# Patient Record
Sex: FEMALE | Race: BLACK OR AFRICAN AMERICAN | Employment: PART TIME | URBAN - METROPOLITAN AREA
[De-identification: names, ages, dates, MRNs, and addresses within clinical notes are randomized per-mention and may not be internally consistent; named-entity substitution may affect disease eponyms.]

---

## 2017-01-26 ENCOUNTER — OFFICE VISIT (OUTPATIENT)
Dept: INTERNAL MEDICINE CLINIC | Age: 62
End: 2017-01-26

## 2017-01-26 VITALS
WEIGHT: 139.13 LBS | TEMPERATURE: 97.8 F | HEART RATE: 85 BPM | DIASTOLIC BLOOD PRESSURE: 89 MMHG | HEIGHT: 68 IN | SYSTOLIC BLOOD PRESSURE: 141 MMHG | RESPIRATION RATE: 18 BRPM | BODY MASS INDEX: 21.09 KG/M2

## 2017-01-26 DIAGNOSIS — Z11.59 NEED FOR HEPATITIS C SCREENING TEST: ICD-10-CM

## 2017-01-26 DIAGNOSIS — Z00.00 PREVENTATIVE HEALTH CARE: Primary | ICD-10-CM

## 2017-01-26 DIAGNOSIS — E78.00 PURE HYPERCHOLESTEROLEMIA: ICD-10-CM

## 2017-01-26 DIAGNOSIS — I10 ESSENTIAL HYPERTENSION: ICD-10-CM

## 2017-01-26 RX ORDER — ROSUVASTATIN CALCIUM 5 MG/1
2.5 TABLET, COATED ORAL
Qty: 45 TAB | Refills: 2 | Status: SHIPPED | OUTPATIENT
Start: 2017-01-26 | End: 2017-05-13 | Stop reason: SDUPTHER

## 2017-01-26 RX ORDER — LISINOPRIL 10 MG/1
TABLET ORAL
Qty: 90 TAB | Refills: 2 | Status: SHIPPED | OUTPATIENT
Start: 2017-01-26 | End: 2017-09-24 | Stop reason: SDUPTHER

## 2017-01-26 NOTE — PATIENT INSTRUCTIONS
Well Visit, Women 48 to 72: Care Instructions  Your Care Instructions  Physical exams can help you stay healthy. Your doctor has checked your overall health and may have suggested ways to take good care of yourself. He or she also may have recommended tests. At home, you can help prevent illness with healthy eating, regular exercise, and other steps. Follow-up care is a key part of your treatment and safety. Be sure to make and go to all appointments, and call your doctor if you are having problems. It's also a good idea to know your test results and keep a list of the medicines you take. How can you care for yourself at home? · Reach and stay at a healthy weight. This will lower your risk for many problems, such as obesity, diabetes, heart disease, and high blood pressure. · Get at least 30 minutes of exercise on most days of the week. Walking is a good choice. You also may want to do other activities, such as running, swimming, cycling, or playing tennis or team sports. · Do not smoke. Smoking can make health problems worse. If you need help quitting, talk to your doctor about stop-smoking programs and medicines. These can increase your chances of quitting for good. · Protect your skin from too much sun. When you're outdoors from 10 a.m. to 4 p.m., stay in the shade or cover up with clothing and a hat with a wide brim. Wear sunglasses that block UV rays. Even when it's cloudy, put broad-spectrum sunscreen (SPF 30 or higher) on any exposed skin. · See a dentist one or two times a year for checkups and to have your teeth cleaned. · Wear a seat belt in the car. · Limit alcohol to 1 drink a day. Too much alcohol can cause health problems. Follow your doctor's advice about when to have certain tests. These tests can spot problems early. · Cholesterol.  Your doctor will tell you how often to have this done based on your age, family history, or other things that can increase your risk for heart attack and stroke. · Blood pressure. Have your blood pressure checked during a routine doctor visit. Your doctor will tell you how often to check your blood pressure based on your age, your blood pressure results, and other factors. · Mammogram. Ask your doctor how often you should have a mammogram, which is an X-ray of your breasts. A mammogram can spot breast cancer before it can be felt and when it is easiest to treat. · Pap test and pelvic exam. Ask your doctor how often you should have a Pap test. You may not need to have a Pap test as often as you used to. · Vision. Have your eyes checked every year or two or as often as your doctor suggests. Some experts recommend that you have yearly exams for glaucoma and other age-related eye problems starting at age 48. · Hearing. Tell your doctor if you notice any change in your hearing. You can have tests to find out how well you hear. · Diabetes. Ask your doctor whether you should have tests for diabetes. · Colon cancer. You should begin tests for colon cancer at age 48. You may have one of several tests. Your doctor will tell you how often to have tests based on your age and risk. Risks include whether you already had a precancerous polyp removed from your colon or whether your parents, sisters and brothers, or children have had colon cancer. · Thyroid disease. Talk to your doctor about whether to have your thyroid checked as part of a regular physical exam. Women have an increased chance of a thyroid problem. · Osteoporosis. You should begin tests for bone density at age 72. If you are younger than 72, ask your doctor whether you have factors that may increase your risk for this disease. You may want to have this test before age 72. · Heart attack and stroke risk. At least every 4 to 6 years, you should have your risk for heart attack and stroke assessed.  Your doctor uses factors such as your age, blood pressure, cholesterol, and whether you smoke or have diabetes to show what your risk for a heart attack or stroke is over the next 10 years. When should you call for help? Watch closely for changes in your health, and be sure to contact your doctor if you have any problems or symptoms that concern you. Where can you learn more? Go to http://trenton-kristian.info/. Enter V723 in the search box to learn more about \"Well Visit, Women 50 to 72: Care Instructions. \"  Current as of: July 19, 2016  Content Version: 11.1  © 2065-3484 [employer]. Care instructions adapted under license by [employer] (which disclaims liability or warranty for this information). If you have questions about a medical condition or this instruction, always ask your healthcare professional. [address] any warranty or liability for your use of this information.

## 2017-01-26 NOTE — PROGRESS NOTES
HISTORY OF PRESENT ILLNESS  Angela Griffith is a 59 y.o. female. HPI  Angela Griffith is here for complete health maintenance physical exam and screening. she does have other concerns. Hypertension:  Angela Griffith is a 59 y.o. female with hypertension. without Chronic kidney disease stage    Medication change since last visit: No  The patient reports:  taking medications as instructed, no medication side effects noted, home BP monitoring in range of 352'K systolic over 08'S diastolic, no chest pain on exertion, no dyspnea on exertion, no swelling of ankles, no orthostatic dizziness or lightheadedness, no palpitations. Lifestyle modification/social history: generally follows a low fat low cholesterol diet, generally follows a low sodium diet, exercises regularly, nonsmoker    Lab Results   Component Value Date/Time    Sodium 142 07/21/2016 10:36 AM    Potassium 4.7 07/21/2016 10:36 AM    Chloride 103 07/21/2016 10:36 AM    CO2 25 07/21/2016 10:36 AM    Glucose 95 07/21/2016 10:36 AM    BUN 12 07/21/2016 10:36 AM    Creatinine 0.87 07/21/2016 10:36 AM    BUN/Creatinine ratio 14 07/21/2016 10:36 AM    GFR est AA 83 07/21/2016 10:36 AM    GFR est non-AA 72 07/21/2016 10:36 AM    Calcium 9.4 07/21/2016 10:36 AM         Hyperlipidemia:  Angela Griffith is following up on her dyslipidemia. Cardiovascular risks for her are: LDL goal is under 100  hypertension.    Currently she takes  , crestor  Lab Results   Component Value Date/Time    Cholesterol, total 189 07/21/2016 10:36 AM    Cholesterol, total 178 12/22/2015 09:52 AM    Cholesterol, total 261 08/27/2015 09:38 AM    Cholesterol, total 266 05/12/2014 03:55 PM    Cholesterol, total 228 04/29/2013 12:00 AM    HDL Cholesterol 95 07/21/2016 10:36 AM    HDL Cholesterol 85 12/22/2015 09:52 AM    HDL Cholesterol 94 08/27/2015 09:38 AM    HDL Cholesterol 103 05/12/2014 03:55 PM    HDL Cholesterol 88 04/29/2013 12:00 AM    LDL, calculated 87 07/21/2016 10:36 AM    LDL, calculated 82 12/22/2015 09:52 AM    LDL, calculated 148 08/27/2015 09:38 AM    LDL, calculated 151 05/12/2014 03:55 PM    LDL, calculated 130 04/29/2013 12:00 AM    Triglyceride 37 07/21/2016 10:36 AM    Triglyceride 56 12/22/2015 09:52 AM    Triglyceride 94 08/27/2015 09:38 AM    Triglyceride 59 05/12/2014 03:55 PM    Triglyceride 51 04/29/2013 12:00 AM     Lab Results   Component Value Date/Time    ALT 11 12/22/2015 09:52 AM    AST 17 12/22/2015 09:52 AM    Alk. phosphatase 67 12/22/2015 09:52 AM    Bilirubin, direct 0.22 12/22/2015 09:52 AM    Bilirubin, total 0.9 12/22/2015 09:52 AM       Myalgias: no  Fatigue: no          Health maintenance hx includes:  Exercise: moderately active. Form of exercise: walking     Diet: generally follows a low fat low cholesterol diet    Cancer screening:    Colon cancer screening:  Last Colonoscopy: 2015 and   was normal   Breast cancer screening: last mammogram 2016 and   was normal   Cervical cancer screening: last PAP/Pelvic exam: 2016   and was normal.   Osteoporosis screening:  Last BMD:  never    Lab Results   Component Value Date/Time    Cholesterol, total 189 07/21/2016 10:36 AM    HDL Cholesterol 95 07/21/2016 10:36 AM    LDL, calculated 87 07/21/2016 10:36 AM    VLDL, calculated 7 07/21/2016 10:36 AM    Triglyceride 37 07/21/2016 10:36 AM         Lab Results   Component Value Date/Time    Glucose 95 07/21/2016 10:36 AM       Immunizations:     Immunization History   Administered Date(s) Administered    DTAP Vaccine 10/17/2011    Influenza Vaccine 10/29/2012, 10/01/2013, 10/01/2014, 10/01/2015, 10/01/2016    Zoster Vaccine, Live 08/05/2013      Immunization status: up to date and documented.        Social History     Social History    Marital status: SINGLE     Spouse name: N/A    Number of children: N/A    Years of education: N/A     Occupational History     Crisis intake      Social History Main Topics    Smoking status: Never Smoker    Smokeless tobacco: Never Used    Alcohol use No    Drug use: No    Sexual activity: Not Currently     Other Topics Concern    Exercise Yes     walking 3 times daily. Social History Narrative     History reviewed. No pertinent past surgical history. Family History   Problem Relation Age of Onset    Seizures Mother     Heart Failure Father     Other Father      PUD    Heart Disease Father     Hypertension Sister     Breast Cancer Sister 59     no BRCA testing    Cancer Sister      breast    Asthma Brother     Hypertension Sister     Asthma Other     Elevated Lipids Neg Hx     Diabetes Neg Hx      Current Outpatient Prescriptions on File Prior to Visit   Medication Sig Dispense Refill    lisinopril (PRINIVIL, ZESTRIL) 10 mg tablet TAKE 1 TABLET BY MOUTH EVERY DAY 30 Tab 5    rosuvastatin (CRESTOR) 5 mg tablet Take 2.5 mg by mouth nightly.  aspirin delayed-release 81 mg tablet Take  by mouth daily.  melatonin (MELATONIN) 5 mg cap capsule Take 5 mg by mouth as needed. No current facility-administered medications on file prior to visit. .    Review of Systems   Constitutional: Negative for malaise/fatigue and weight loss. HENT: Negative for congestion and sore throat. Eyes: Negative for blurred vision and pain. Respiratory: Negative for cough, shortness of breath and wheezing. Cardiovascular: Negative for chest pain, palpitations and leg swelling. Gastrointestinal: Positive for constipation. Negative for abdominal pain, blood in stool, diarrhea, heartburn, nausea and vomiting. Genitourinary: Negative for dysuria and hematuria. Musculoskeletal: Negative for back pain and joint pain. Skin: Negative for itching and rash. Neurological: Negative for dizziness and headaches. Endo/Heme/Allergies: Negative for environmental allergies. Does not bruise/bleed easily. Psychiatric/Behavioral: Negative for depression and substance abuse.  The patient is not nervous/anxious and does not have insomnia. Physical Exam   Constitutional: She is oriented to person, place, and time. She appears well-developed and well-nourished. No distress. /89 (BP 1 Location: Left arm, BP Patient Position: Sitting)  Pulse 85  Temp 97.8 °F (36.6 °C) (Oral)   Resp 18  Ht 5' 8\" (1.727 m)  Wt 139 lb 2 oz (63.1 kg)  BMI 21.15 kg/m2Body mass index is 21.15 kg/(m^2). HENT:   Head: Normocephalic. Right Ear: Hearing, tympanic membrane and ear canal normal. No decreased hearing is noted. Left Ear: Hearing, tympanic membrane and ear canal normal. No decreased hearing is noted. Nose: Nose normal.   Mouth/Throat: Oropharynx is clear and moist and mucous membranes are normal. Normal dentition. No oropharyngeal exudate. Eyes: Conjunctivae and lids are normal. Pupils are equal, round, and reactive to light. No scleral icterus. Neck: Trachea normal and normal range of motion. Neck supple. No thyromegaly present. Cardiovascular: Normal rate, regular rhythm, normal heart sounds and intact distal pulses. No murmur heard. Pulmonary/Chest: Effort normal and breath sounds normal.   Abdominal: Soft. Normal appearance and bowel sounds are normal. She exhibits no distension and no mass. There is no hepatosplenomegaly. There is no tenderness. Musculoskeletal: Normal range of motion. She exhibits no edema or tenderness. Lymphadenopathy:     She has no cervical adenopathy. Neurological: She is alert and oriented to person, place, and time. Skin: Skin is warm and dry. No rash noted. She is not diaphoretic. Psychiatric: She has a normal mood and affect. Her speech is normal and behavior is normal. Judgment and thought content normal. Cognition and memory are normal.   Nursing note and vitals reviewed. ASSESSMENT and PLAN  Félix Caballero was seen today for well woman.     Diagnoses and all orders for this visit:    Preventative health care  she was advised to have follow up colonoscopy in 2025  Pap/ mammogram per her GYN specialist yearly. Sarah Escamilla was counseled on age-appropriate/ guideline-based risk prevention behaviors and screening for a 59y.o. year old   female . We also discussed adjustments in screening based on family history if necessary. Printed instructions for preventative screening guidelines and healthy behaviors given to patient with after visit summary. Essential hypertension - Well controlled and stable. her medications were reviewed and refilled where necessary as noted below. Labs ordered as noted. -     METABOLIC PANEL, BASIC  -     lisinopril (PRINIVIL, ZESTRIL) 10 mg tablet; TAKE 1 TABLET BY MOUTH EVERY DAY    Pure hypercholesterolemia - Well controlled and stable. her medications were reviewed and refilled where necessary as noted below. Labs ordered as noted. -     LIPID PANEL  -     rosuvastatin (CRESTOR) 5 mg tablet; Take 0.5 Tabs by mouth nightly. Need for hepatitis C screening test  -     HEPATITIS C AB      Follow-up Disposition:  Return in about 6 months (around 7/26/2017).

## 2017-01-26 NOTE — MR AVS SNAPSHOT
Visit Information Date & Time Provider Department Dept. Phone Encounter #  
 1/26/2017 10:15 AM Ramila Rosenberg MD [facility] [address] [facility] [id number] Follow-up Instructions Return in about 6 months (around 7/26/2017). Your Appointments 5/23/2017 10:30 AM  
ESTABLISHED PATIENT with Sula Sandifer, MD  
Lázaro Bain ([address]) Appt Note: ae  
 [address] [facility] [address] [address]  
[facility] [address] [place] [address] Upcoming Health Maintenance Date Due Hepatitis C Screening 1955 INFLUENZA AGE 9 TO ADULT 8/1/2016 BREAST CANCER SCRN MAMMOGRAM 2/26/2017 PAP AKA CERVICAL CYTOLOGY 5/13/2020 DTaP/Tdap/Td series (2 - Td) 10/17/2021 COLONOSCOPY 9/1/2025 Allergies as of 1/26/2017  Review Complete On: 1/26/2017 By: Ramila Rosenberg MD  
 No Known Allergies Current Immunizations  Reviewed on 1/26/2017 Name Date DTAP Vaccine 10/17/2011 Influenza Vaccine 10/1/2016, 10/1/2015, 10/1/2014, 10/1/2013, 10/29/2012 Zoster Vaccine, Live 8/5/2013 Reviewed by Ramila Rosenberg MD on 1/26/2017 at 10:37 AM  
You Were Diagnosed With   
  
 Codes Comments Preventative health care    -  Primary ICD-10-CM: Z00.00 ICD-9-CM: V70.0 Essential hypertension     ICD-10-CM: I10 
ICD-9-CM: 401.9 Pure hypercholesterolemia     ICD-10-CM: E78.00 ICD-9-CM: 272.0 Need for hepatitis C screening test     ICD-10-CM: Z11.59 
ICD-9-CM: V73.89 Vitals BP Pulse Temp Resp Height(growth percentile) Weight(growth percentile) 141/89 (BP 1 Location: Left arm, BP Patient Position: Sitting) 85 97.8 °F (36.6 °C) (Oral) 18 5' 8\" (1.727 m) 139 lb 2 oz (63.1 kg) BMI OB Status Smoking Status 21.15 kg/m2 Postmenopausal Never Smoker Vitals History BMI and BSA Data Body Mass Index Body Surface Area 21.15 kg/m 2 1.74 m 2 Preferred Pharmacy Pharmacy Name Phone [facility] [address] [phone] Your Updated Medication List  
  
   
This list is accurate as of: 1/26/17 10:49 AM.  Always use your most recent med list.  
  
  
  
  
 aspirin delayed-release 81 mg tablet Take  by mouth daily. lisinopril 10 mg tablet Commonly known as:  PRINIVIL, ZESTRIL  
TAKE 1 TABLET BY MOUTH EVERY DAY  
  
 melatonin 5 mg Cap capsule Take 5 mg by mouth as needed. rosuvastatin 5 mg tablet Commonly known as:  CRESTOR Take 0.5 Tabs by mouth nightly. Prescriptions Sent to Pharmacy Refills  
 rosuvastatin (CRESTOR) 5 mg tablet 2 Sig: Take 0.5 Tabs by mouth nightly. Class: Normal  
 Pharmacy: [facility] [address] [place] [address] Ph #: [phone] Route: Oral  
 lisinopril (PRINIVIL, ZESTRIL) 10 mg tablet 2 Sig: TAKE 1 TABLET BY MOUTH EVERY DAY Class: Normal  
 Pharmacy: [facility] [address] [place] [address] Ph #: [phone] We Performed the Following HEPATITIS C AB [50777 CPT(R)] LIPID PANEL [83750 CPT(R)] METABOLIC PANEL, BASIC [35359 CPT(R)] Follow-up Instructions Return in about 6 months (around 7/26/2017). Patient Instructions Well Visit, Women 48 to 72: Care Instructions Your Care Instructions Physical exams can help you stay healthy. Your doctor has checked your overall health and may have suggested ways to take good care of yourself. He or she also may have recommended tests. At home, you can help prevent illness with healthy eating, regular exercise, and other steps. Follow-up care is a key part of your treatment and safety.  Be sure to make and go to all appointments, and call your doctor if you are having problems. It's also a good idea to know your test results and keep a list of the medicines you take. How can you care for yourself at home? · Reach and stay at a healthy weight. This will lower your risk for many problems, such as obesity, diabetes, heart disease, and high blood pressure. · Get at least 30 minutes of exercise on most days of the week. Walking is a good choice. You also may want to do other activities, such as running, swimming, cycling, or playing tennis or team sports. · Do not smoke. Smoking can make health problems worse. If you need help quitting, talk to your doctor about stop-smoking programs and medicines. These can increase your chances of quitting for good. · Protect your skin from too much sun. When you're outdoors from 10 a.m. to 4 p.m., stay in the shade or cover up with clothing and a hat with a wide brim. Wear sunglasses that block UV rays. Even when it's cloudy, put broad-spectrum sunscreen (SPF 30 or higher) on any exposed skin. · See a dentist one or two times a year for checkups and to have your teeth cleaned. · Wear a seat belt in the car. · Limit alcohol to 1 drink a day. Too much alcohol can cause health problems. Follow your doctor's advice about when to have certain tests. These tests can spot problems early. · Cholesterol. Your doctor will tell you how often to have this done based on your age, family history, or other things that can increase your risk for heart attack and stroke. · Blood pressure. Have your blood pressure checked during a routine doctor visit. Your doctor will tell you how often to check your blood pressure based on your age, your blood pressure results, and other factors. · Mammogram. Ask your doctor how often you should have a mammogram, which is an X-ray of your breasts. A mammogram can spot breast cancer before it can be felt and when it is easiest to treat.  
· Pap test and pelvic exam. Ask your doctor how often you should have a Pap test. You may not need to have a Pap test as often as you used to. · Vision. Have your eyes checked every year or two or as often as your doctor suggests. Some experts recommend that you have yearly exams for glaucoma and other age-related eye problems starting at age 48. · Hearing. Tell your doctor if you notice any change in your hearing. You can have tests to find out how well you hear. · Diabetes. Ask your doctor whether you should have tests for diabetes. · Colon cancer. You should begin tests for colon cancer at age 48. You may have one of several tests. Your doctor will tell you how often to have tests based on your age and risk. Risks include whether you already had a precancerous polyp removed from your colon or whether your parents, sisters and brothers, or children have had colon cancer. · Thyroid disease. Talk to your doctor about whether to have your thyroid checked as part of a regular physical exam. Women have an increased chance of a thyroid problem. · Osteoporosis. You should begin tests for bone density at age 72. If you are younger than 72, ask your doctor whether you have factors that may increase your risk for this disease. You may want to have this test before age 72. · Heart attack and stroke risk. At least every 4 to 6 years, you should have your risk for heart attack and stroke assessed. Your doctor uses factors such as your age, blood pressure, cholesterol, and whether you smoke or have diabetes to show what your risk for a heart attack or stroke is over the next 10 years. When should you call for help? Watch closely for changes in your health, and be sure to contact your doctor if you have any problems or symptoms that concern you. Where can you learn more? Go to http://trenton-kristian.info/. Enter M971 in the search box to learn more about \"Well Visit, Women 50 to 72: Care Instructions. \" Current as of: July 19, 2016 Content Version: 11.1 © 2335-2208 [employer]orporated. Care instructions adapted under license by [employer] (which disclaims liability or warranty for this information). If you have questions about a medical condition or this instruction, always ask your healthcare professional. [address] any warranty or liability for your use of this information. Introducing [facility]! Calli Rockwell introduces Zen[employer]ime patient portal. Now you can access parts of your medical record, email your doctor's office, and request medication refills online. 1. In your internet browser, go to https://[employer]. [employer] 2. Click on the First Time User? Click Here link in the Sign In box. You will see the New Member Sign Up page. 3. Enter your Zen[employer]ime Access Code exactly as it appears below. You will not need to use this code after youve completed the sign-up process. If you do not sign up before the expiration date, you must request a new code. · Zen[employer] Access Code: [email or web address] Expires: 4/26/2017 10:36 AM 
 
4. Enter the last four digits of your Social Security Number (xxxx) and Date of Birth (mm/dd/yyyy) as indicated and click Submit. You will be taken to the next sign-up page. 5. Create a Zenytime ID. This will be your Zenytime login ID and cannot be changed, so think of one that is secure and easy to remember. 6. Create a [employer] password. You can change your password at any time. 7. Enter your Password Reset Question and Answer. This can be used at a later time if you forget your password. 8. Enter your e-mail address. You will receive e-mail notification when new information is available in [address] 9. Click Sign Up. You can now view and download portions of your medical record. 10. Click the Download Summary menu link to download a portable copy of your medical information.  
 
If you have questions, please visit the Frequently Asked Questions section of the "[employer]. Remember, [employer] is NOT to be used for urgent needs. For medical emergencies, dial 911. Now available from your iPhone and Android! Please provide this summary of care documentation to your next provider. Your primary care clinician is listed as Rubina Wright. If you have any questions after today's visit, please call [phone].

## 2017-01-27 LAB
BUN SERPL-MCNC: 16 MG/DL (ref 8–27)
BUN/CREAT SERPL: 18 (ref 11–26)
CALCIUM SERPL-MCNC: 9.9 MG/DL (ref 8.7–10.3)
CHLORIDE SERPL-SCNC: 99 MMOL/L (ref 96–106)
CHOLEST SERPL-MCNC: 222 MG/DL (ref 100–199)
CO2 SERPL-SCNC: 27 MMOL/L (ref 18–29)
CREAT SERPL-MCNC: 0.88 MG/DL (ref 0.57–1)
GLUCOSE SERPL-MCNC: 86 MG/DL (ref 65–99)
HCV AB S/CO SERPL IA: <0.1 S/CO RATIO (ref 0–0.9)
HDLC SERPL-MCNC: 112 MG/DL
INTERPRETATION, 910389: NORMAL
LDLC SERPL CALC-MCNC: 100 MG/DL (ref 0–99)
POTASSIUM SERPL-SCNC: 4.2 MMOL/L (ref 3.5–5.2)
SODIUM SERPL-SCNC: 141 MMOL/L (ref 134–144)
TRIGL SERPL-MCNC: 50 MG/DL (ref 0–149)
VLDLC SERPL CALC-MCNC: 10 MG/DL (ref 5–40)

## 2017-05-23 ENCOUNTER — OFFICE VISIT (OUTPATIENT)
Dept: OBGYN CLINIC | Age: 62
End: 2017-05-23

## 2017-05-23 VITALS
BODY MASS INDEX: 21.82 KG/M2 | DIASTOLIC BLOOD PRESSURE: 80 MMHG | SYSTOLIC BLOOD PRESSURE: 126 MMHG | WEIGHT: 144 LBS | HEIGHT: 68 IN

## 2017-05-23 DIAGNOSIS — Z01.419 ENCOUNTER FOR GYNECOLOGICAL EXAMINATION (GENERAL) (ROUTINE) WITHOUT ABNORMAL FINDINGS: Primary | ICD-10-CM

## 2017-05-23 NOTE — PATIENT INSTRUCTIONS
Well Visit, Women 48 to 72: Care Instructions  Your Care Instructions  Physical exams can help you stay healthy. Your doctor has checked your overall health and may have suggested ways to take good care of yourself. He or she also may have recommended tests. At home, you can help prevent illness with healthy eating, regular exercise, and other steps. Follow-up care is a key part of your treatment and safety. Be sure to make and go to all appointments, and call your doctor if you are having problems. It's also a good idea to know your test results and keep a list of the medicines you take. How can you care for yourself at home? · Reach and stay at a healthy weight. This will lower your risk for many problems, such as obesity, diabetes, heart disease, and high blood pressure. · Get at least 30 minutes of exercise on most days of the week. Walking is a good choice. You also may want to do other activities, such as running, swimming, cycling, or playing tennis or team sports. · Do not smoke. Smoking can make health problems worse. If you need help quitting, talk to your doctor about stop-smoking programs and medicines. These can increase your chances of quitting for good. · Protect your skin from too much sun. When you're outdoors from 10 a.m. to 4 p.m., stay in the shade or cover up with clothing and a hat with a wide brim. Wear sunglasses that block UV rays. Even when it's cloudy, put broad-spectrum sunscreen (SPF 30 or higher) on any exposed skin. · See a dentist one or two times a year for checkups and to have your teeth cleaned. · Wear a seat belt in the car. · Limit alcohol to 1 drink a day. Too much alcohol can cause health problems. Follow your doctor's advice about when to have certain tests. These tests can spot problems early. · Cholesterol.  Your doctor will tell you how often to have this done based on your age, family history, or other things that can increase your risk for heart attack and stroke. · Blood pressure. Have your blood pressure checked during a routine doctor visit. Your doctor will tell you how often to check your blood pressure based on your age, your blood pressure results, and other factors. · Mammogram. Ask your doctor how often you should have a mammogram, which is an X-ray of your breasts. A mammogram can spot breast cancer before it can be felt and when it is easiest to treat. · Pap test and pelvic exam. Ask your doctor how often you should have a Pap test. You may not need to have a Pap test as often as you used to. · Vision. Have your eyes checked every year or two or as often as your doctor suggests. Some experts recommend that you have yearly exams for glaucoma and other age-related eye problems starting at age 48. · Hearing. Tell your doctor if you notice any change in your hearing. You can have tests to find out how well you hear. · Diabetes. Ask your doctor whether you should have tests for diabetes. · Colon cancer. You should begin tests for colon cancer at age 48. You may have one of several tests. Your doctor will tell you how often to have tests based on your age and risk. Risks include whether you already had a precancerous polyp removed from your colon or whether your parents, sisters and brothers, or children have had colon cancer. · Thyroid disease. Talk to your doctor about whether to have your thyroid checked as part of a regular physical exam. Women have an increased chance of a thyroid problem. · Osteoporosis. You should begin tests for bone density at age 72. If you are younger than 72, ask your doctor whether you have factors that may increase your risk for this disease. You may want to have this test before age 72. · Heart attack and stroke risk. At least every 4 to 6 years, you should have your risk for heart attack and stroke assessed.  Your doctor uses factors such as your age, blood pressure, cholesterol, and whether you smoke or have diabetes to show what your risk for a heart attack or stroke is over the next 10 years. When should you call for help? Watch closely for changes in your health, and be sure to contact your doctor if you have any problems or symptoms that concern you. Where can you learn more? Go to http://trenton-kristian.info/. Enter J344 in the search box to learn more about \"Well Visit, Women 50 to 72: Care Instructions. \"  Current as of: July 19, 2016  Content Version: 11.2  © 2940-1537 Healthwise, Incorporated. Care instructions adapted under license by [employer] (which disclaims liability or warranty for this information). If you have questions about a medical condition or this instruction, always ask your healthcare professional. [address] any warranty or liability for your use of this information.

## 2017-05-23 NOTE — MR AVS SNAPSHOT
Visit Information Date & Time Provider Department Dept. Phone Encounter #  
 5/23/2017 10:30 AM Wellington Zeng MD [facility] [phone] [id number] Your Appointments 7/20/2017  9:45 AM  
ROUTINE CARE with Frank Solorio MD  
[facility]) Appt Note: 6 mnth fu BP  
 [address] [facility] [place] [address] [address]  
[phone]  
  
   
 [address] [facility] [address] Upcoming Health Maintenance Date Due INFLUENZA AGE 9 TO ADULT 8/1/2017 BREAST CANCER SCRN MAMMOGRAM 2/23/2018 PAP AKA CERVICAL CYTOLOGY 5/13/2020 COLONOSCOPY 9/1/2025 Allergies as of 5/23/2017  Review Complete On: 5/23/2017 By: Tyra Orantes LPN No Known Allergies Current Immunizations  Reviewed on 1/26/2017 Name Date DTAP Vaccine 10/17/2011 Influenza Vaccine 10/1/2016, 10/1/2015, 10/1/2014, 10/1/2013, 10/29/2012 Zoster Vaccine, Live 8/5/2013 Not reviewed this visit Vitals BP Height(growth percentile) Weight(growth percentile) BMI OB Status Smoking Status 126/80 5' 8\" (1.727 m) 144 lb (65.3 kg) 21.9 kg/m2 Postmenopausal Never Smoker BMI and BSA Data Body Mass Index Body Surface Area  
 21.9 kg/m 2 1.77 m 2 Preferred Pharmacy Pharmacy Name Phone [facility] [address] [phone] Your Updated Medication List  
  
   
This list is accurate as of: 5/23/17 10:40 AM.  Always use your most recent med list.  
  
  
  
  
 aspirin delayed-release 81 mg tablet Take  by mouth daily. lisinopril 10 mg tablet Commonly known as:  PRINIVIL, ZESTRIL  
TAKE 1 TABLET BY MOUTH EVERY DAY  
  
 melatonin 5 mg Cap capsule Take 5 mg by mouth as needed. rosuvastatin 5 mg tablet Commonly known as:  CRESTOR  
TAKE 1 TABLET BY MOUTH NIGHTLY Patient Instructions Well Visit, Women 48 to 72: Care Instructions Your Care Instructions Physical exams can help you stay healthy. Your doctor has checked your overall health and may have suggested ways to take good care of yourself. He or she also may have recommended tests. At home, you can help prevent illness with healthy eating, regular exercise, and other steps. Follow-up care is a key part of your treatment and safety. Be sure to make and go to all appointments, and call your doctor if you are having problems. It's also a good idea to know your test results and keep a list of the medicines you take. How can you care for yourself at home? · Reach and stay at a healthy weight. This will lower your risk for many problems, such as obesity, diabetes, heart disease, and high blood pressure. · Get at least 30 minutes of exercise on most days of the week. Walking is a good choice. You also may want to do other activities, such as running, swimming, cycling, or playing tennis or team sports. · Do not smoke. Smoking can make health problems worse. If you need help quitting, talk to your doctor about stop-smoking programs and medicines. These can increase your chances of quitting for good. · Protect your skin from too much sun. When you're outdoors from 10 a.m. to 4 p.m., stay in the shade or cover up with clothing and a hat with a wide brim. Wear sunglasses that block UV rays. Even when it's cloudy, put broad-spectrum sunscreen (SPF 30 or higher) on any exposed skin. · See a dentist one or two times a year for checkups and to have your teeth cleaned. · Wear a seat belt in the car. · Limit alcohol to 1 drink a day. Too much alcohol can cause health problems. Follow your doctor's advice about when to have certain tests. These tests can spot problems early. · Cholesterol.  Your doctor will tell you how often to have this done based on your age, family history, or other things that can increase your risk for heart attack and stroke. · Blood pressure. Have your blood pressure checked during a routine doctor visit. Your doctor will tell you how often to check your blood pressure based on your age, your blood pressure results, and other factors. · Mammogram. Ask your doctor how often you should have a mammogram, which is an X-ray of your breasts. A mammogram can spot breast cancer before it can be felt and when it is easiest to treat. · Pap test and pelvic exam. Ask your doctor how often you should have a Pap test. You may not need to have a Pap test as often as you used to. · Vision. Have your eyes checked every year or two or as often as your doctor suggests. Some experts recommend that you have yearly exams for glaucoma and other age-related eye problems starting at age 48. · Hearing. Tell your doctor if you notice any change in your hearing. You can have tests to find out how well you hear. · Diabetes. Ask your doctor whether you should have tests for diabetes. · Colon cancer. You should begin tests for colon cancer at age 48. You may have one of several tests. Your doctor will tell you how often to have tests based on your age and risk. Risks include whether you already had a precancerous polyp removed from your colon or whether your parents, sisters and brothers, or children have had colon cancer. · Thyroid disease. Talk to your doctor about whether to have your thyroid checked as part of a regular physical exam. Women have an increased chance of a thyroid problem. · Osteoporosis. You should begin tests for bone density at age 72. If you are younger than 72, ask your doctor whether you have factors that may increase your risk for this disease. You may want to have this test before age 72. · Heart attack and stroke risk. At least every 4 to 6 years, you should have your risk for heart attack and stroke assessed.  Your doctor uses factors such as your age, blood pressure, cholesterol, and whether you smoke or have diabetes to show what your risk for a heart attack or stroke is over the next 10 years. When should you call for help? Watch closely for changes in your health, and be sure to contact your doctor if you have any problems or symptoms that concern you. Where can you learn more? Go to http://trenton-kristian.info/. Enter J646 in the search box to learn more about \"Well Visit, Women 50 to 72: Care Instructions. \" Current as of: July 19, 2016 Content Version: 11.2 © 9122-2606 [employer]IVS. Care instructions adapted under license by [employer] (which disclaims liability or warranty for this information). If you have questions about a medical condition or this instruction, always ask your healthcare professional. [address] any warranty or liability for your use of this information. Introducing [facility]! [employer] introduces Prestodiag patient portal. Now you can access parts of your medical record, email your doctor's office, and request medication refills online. 1. In your internet browser, go to [email or web address] 2. Click on the First Time User? Click Here link in the Sign In box. You will see the New Member Sign Up page. 3. Enter your Prestodiag Access Code exactly as it appears below. You will not need to use this code after youve completed the sign-up process. If you do not sign up before the expiration date, you must request a new code. · Prestodiag Access Code: [email or web address] Expires: 8/21/2017 10:40 AM 
 
4. Enter the last four digits of your Social Security Number (xxxx) and Date of Birth (mm/dd/yyyy) as indicated and click Submit. You will be taken to the next sign-up page. 5. Create a Prestodiag ID. This will be your Prestodiag login ID and cannot be changed, so think of one that is secure and easy to remember. 6. Create a Consultant Marketplacet password. You can change your password at any time. 7. Enter your Password Reset Question and Answer. This can be used at a later time if you forget your password. 8. Enter your e-mail address. You will receive e-mail notification when new information is available in [address] 9. Click Sign Up. You can now view and download portions of your medical record. 10. Click the Download Summary menu link to download a portable copy of your medical information. If you have questions, please visit the Frequently Asked Questions section of the Knowmia website. Remember, Knowmia is NOT to be used for urgent needs. For medical emergencies, dial 911. Now available from your iPhone and Android! Please provide this summary of care documentation to your next provider. Your primary care clinician is listed as Diamond Almanzar. If you have any questions after today's visit, please call [phone].

## 2017-05-23 NOTE — PROGRESS NOTES
[address] [facility]  [email or web address]  [phone]    Tisha Hodge MD, [facility]       Annual Gynecologic Exam:   Thee Hassan 39 60+  Chief Complaint   Patient presents with    Well Woman         Jackie Smithy is a 59 y.o. No obstetric history on file. BLACK OR   female who presents for an annual exam.    She does not report additional concerns today. Sexual history and Contraception:  History   Sexual Activity    Sexual activity: Not Currently     She does not reports new sexual partner(s) in the last year. Preventive Medicine History:  Her last annual GYN exam was about one year ago. Her most recent Pap smear result: normal was obtained in May 2015. Her most recent HR HPV screen was Negative obtained 2 year(s) ago. She does not have a history of SALAZAR 2, 3 or cervical cancer. Breast health:  Last mammogram: approximate date 2/23/17 and was normal.   A mammogram was not scheduled for today. Breast cancer family updated: see FH. Bone health: Simran Vilaks She has not had a bone density scan in the past.   Last bone density test results: N/A patient has never had a bone density scan. She does not have a history of osteopenia/osteoporosis. Osteoporosis family history updated: see . Past Medical History:   Diagnosis Date    HSV (herpes simplex virus) infection     Hx of mammogram 1/2014    negative per pt    Pap smear for cervical cancer screening 4/16/10; 5/13/15    negative, HPV negative; Negative, HPV negative     No past surgical history on file.   Family History   Problem Relation Age of Onset    Seizures Mother     Heart Failure Father     Other Father      PUD    Heart Disease Father     Hypertension Sister     Breast Cancer Sister 59     no BRCA testing    Cancer Sister      breast    Asthma Brother     Hypertension Sister     Asthma Other     Elevated Lipids Neg Hx     Diabetes Neg Hx      Social History     Social History    Marital status: SINGLE     Spouse name: N/A    Number of children: N/A    Years of education: N/A     Occupational History     Crisis intake      Social History Main Topics    Smoking status: Never Smoker    Smokeless tobacco: Never Used    Alcohol use No    Drug use: No    Sexual activity: Not Currently     Other Topics Concern    Exercise Yes     walking 3 times daily. Social History Narrative       No Known Allergies    Current Outpatient Prescriptions   Medication Sig    rosuvastatin (CRESTOR) 5 mg tablet TAKE 1 TABLET BY MOUTH NIGHTLY    lisinopril (PRINIVIL, ZESTRIL) 10 mg tablet TAKE 1 TABLET BY MOUTH EVERY DAY    aspirin delayed-release 81 mg tablet Take  by mouth daily.  melatonin (MELATONIN) 5 mg cap capsule Take 5 mg by mouth as needed. No current facility-administered medications for this visit.         Patient Active Problem List   Diagnosis Code    Panic disorder F41.0    Dysthymia F34.1    FH: breast cancer in first degree relative Z80.3    FH: brain cancer Z80.8    FH: breast cancer Z80.3    Elevated BP [id number]    Carotid artery calcification I65.29    Essential hypertension I10       Review of Systems - History obtained from the patient  Constitutional: negative for weight loss, fever, night sweats  HEENT: negative for hearing loss, earache, congestion, snoring, sorethroat  CV: negative for chest pain, palpitations, edema  Resp: negative for cough, shortness of breath, wheezing  GI: negative for change in bowel habits, abdominal pain, black or bloody stools  : negative for frequency, dysuria, hematuria, vaginal discharge  MSK: negative for back pain, joint pain, muscle pain  Breast: negative for breast lumps, nipple discharge, galactorrhea  Skin :negative for itching, rash, hives  Neuro: negative for dizziness, headache, confusion, weakness  Psych: negative for anxiety, depression, change in mood  Heme/lymph: negative for bleeding, bruising, pallor    Physical Exam  Visit Vitals    /80    Ht 5' 8\" (1.727 m)    Wt 144 lb (65.3 kg)    BMI 21.9 kg/m2       Constitutional  · Appearance: well-nourished, well developed, alert, in no acute distress    HENT  · Head and Face: appears normal    Neck  · Inspection/Palpation: normal appearance, no masses or tenderness  · Lymph Nodes: no lymphadenopathy present  · Thyroid: gland size normal, nontender, no nodules or masses present on palpation    Chest  · Respiratory Effort: breathing labored  · Auscultation: normal breath sounds    Cardiovascular  · Heart:  · Auscultation: regular rate and rhythm without murmur    Breasts  · Inspection of Breasts: breasts symmetrical, no skin changes, no discharge present, nipple appearance normal, no skin retraction present  · Palpation of Breasts and Axillae: no masses present on palpation, no breast tenderness  · Axillary Lymph Nodes: no lymphadenopathy present    Gastrointestinal  · Abdominal Examination: abdomen non-tender to palpation, normal bowel sounds, no masses present  · Liver and spleen: no hepatomegaly present, spleen not palpable  · Hernias: no hernias identified    Genitourinary  · External Genitalia: normal appearance for age, no discharge present, no tenderness present, no inflammatory lesions present, no masses present, mild atrophy present  · Vagina: normal vaginal vault without central or paravaginal defects, no discharge present, no inflammatory lesions present, no masses present  · Bladder: non-tender to palpation  · Urethra: appears normal  · Cervix: normal   · Uterus: normal size, shape and consistency  · Adnexa: no adnexal tenderness present, no adnexal masses present  · Perineum: perineum within normal limits, no evidence of trauma, no rashes or skin lesions present  · Anus: anus within normal limits, no hemorrhoids present  · Inguinal Lymph Nodes: no lymphadenopathy present    Skin  · General Inspection: no rash, no lesions identified    Neurologic/Psychiatric  · Mental Status:  · Orientation: grossly oriented to person, place and time  · Mood and Affect: mood normal, affect appropriate    Assessment:  59 y.o. No obstetric history on file. for well woman exam  Encounter Diagnosis   Name Primary?  Encounter for gynecological examination (general) (routine) without abnormal findings Yes       Plan:  The patient was counseled about diet, exercise, healthy lifestyle  We discussed current self breast exam and mammogram recommendations  We discussed current pap smear and HR HPV testing guidelines  We recommend follow up in one year for routine annual gynecologic exam or sooner if needed  We recommend follow up with a primary care physician for any chronic medical problems or non-gynecologic concerns    We discussed calcium/vitamin D/weight bearing exercise and osteoporosis prevention, h/o given  Handouts were given to the patient     Folllow up:  [x] return for annual well woman exam in one year or sooner if she is having problems  [] follow up and ultrasound  [] mammogram  [] 6 months  [] 6 weeks   []     No orders of the defined types were placed in this encounter. No results found for any visits on 05/23/17.

## 2017-07-20 ENCOUNTER — OFFICE VISIT (OUTPATIENT)
Dept: INTERNAL MEDICINE CLINIC | Age: 62
End: 2017-07-20

## 2017-07-20 VITALS
WEIGHT: 139.5 LBS | BODY MASS INDEX: 21.14 KG/M2 | HEART RATE: 82 BPM | OXYGEN SATURATION: 97 % | SYSTOLIC BLOOD PRESSURE: 131 MMHG | HEIGHT: 68 IN | RESPIRATION RATE: 18 BRPM | DIASTOLIC BLOOD PRESSURE: 87 MMHG | TEMPERATURE: 98.3 F

## 2017-07-20 DIAGNOSIS — E78.5 DYSLIPIDEMIA, GOAL LDL BELOW 70: Primary | ICD-10-CM

## 2017-07-20 DIAGNOSIS — I10 ESSENTIAL HYPERTENSION: ICD-10-CM

## 2017-07-20 RX ORDER — ROSUVASTATIN CALCIUM 5 MG/1
2.5 TABLET, COATED ORAL DAILY
Qty: 1 TAB | Refills: 0
Start: 2017-07-20 | End: 2017-09-24 | Stop reason: SDUPTHER

## 2017-07-20 NOTE — PROGRESS NOTES
HISTORY OF PRESENT ILLNESS  Luis Starr is a 59 y.o. female. HPI  Hypertension:  Luis Starr is a 59 y.o. female with hypertension. without Chronic kidney disease stage    Medication change since last visit: No  The patient reports:  taking medications as instructed, no medication side effects noted, no chest pain on exertion, no dyspnea on exertion, no swelling of ankles, no orthostatic dizziness or lightheadedness, no palpitations. Lifestyle modification/social history: generally follows a low fat low cholesterol diet, exercises regularly, nonsmoker    Lab Results   Component Value Date/Time    Sodium 141 01/26/2017 11:20 AM    Potassium 4.2 01/26/2017 11:20 AM    Chloride 99 01/26/2017 11:20 AM    CO2 27 01/26/2017 11:20 AM    Glucose 86 01/26/2017 11:20 AM    BUN 16 01/26/2017 11:20 AM    Creatinine 0.88 01/26/2017 11:20 AM    BUN/Creatinine ratio 18 01/26/2017 11:20 AM    GFR est AA 81 01/26/2017 11:20 AM    GFR est non-AA 70 01/26/2017 11:20 AM    Calcium 9.9 01/26/2017 11:20 AM         Hyperlipidemia:  Luis Starr is following up on her dyslipidemia. Cardiovascular risks for her are: LDL goal is under 80  prior CVA/TIA or known carotid artery disease.    Currently she takes  , crestor  Lab Results   Component Value Date/Time    Cholesterol, total 222 01/26/2017 11:20 AM    Cholesterol, total 189 07/21/2016 10:36 AM    Cholesterol, total 178 12/22/2015 09:52 AM    Cholesterol, total 261 08/27/2015 09:38 AM    Cholesterol, total 266 05/12/2014 03:55 PM    HDL Cholesterol 112 01/26/2017 11:20 AM    HDL Cholesterol 95 07/21/2016 10:36 AM    HDL Cholesterol 85 12/22/2015 09:52 AM    HDL Cholesterol 94 08/27/2015 09:38 AM    HDL Cholesterol 103 05/12/2014 03:55 PM    LDL, calculated 100 01/26/2017 11:20 AM    LDL, calculated 87 07/21/2016 10:36 AM    LDL, calculated 82 12/22/2015 09:52 AM    LDL, calculated 148 08/27/2015 09:38 AM    LDL, calculated 151 05/12/2014 03:55 PM Triglyceride 50 01/26/2017 11:20 AM    Triglyceride 37 07/21/2016 10:36 AM    Triglyceride 56 12/22/2015 09:52 AM    Triglyceride 94 08/27/2015 09:38 AM    Triglyceride 59 05/12/2014 03:55 PM     Lab Results   Component Value Date/Time    ALT (SGPT) 11 12/22/2015 09:52 AM    AST (SGOT) 17 12/22/2015 09:52 AM    Alk. phosphatase 67 12/22/2015 09:52 AM    Bilirubin, direct 0.22 12/22/2015 09:52 AM    Bilirubin, total 0.9 12/22/2015 09:52 AM       Myalgias: rare occurances still. Fatigue: no          ROS    Physical Exam   Constitutional: She appears well-developed and well-nourished. No distress. /87 (BP 1 Location: Left arm, BP Patient Position: Sitting)  Pulse 82  Temp 98.3 °F (36.8 °C) (Oral)   Resp 18  Ht 5' 7.5\" (1.715 m)  Wt 139 lb 8 oz (63.3 kg)  SpO2 97%  BMI 21.53 kg/m2Body mass index is 21.53 kg/(m^2). HENT:   Mouth/Throat: Oropharynx is clear and moist.   Neck: No JVD present. Carotid bruit is not present. Cardiovascular: Normal rate, regular rhythm, normal heart sounds and intact distal pulses. Pulmonary/Chest: Effort normal and breath sounds normal.   Musculoskeletal: She exhibits no edema. Neurological: She is alert. Skin: Skin is warm and dry. She is not diaphoretic. Nursing note and vitals reviewed. ASSESSMENT and PLAN  Raisa Hendrix was seen today for cholesterol problem. Diagnoses and all orders for this visit:    Dyslipidemia, goal LDL below 70 - not to goal last check however she is almost intolerant of statins low dose. Recheck now. May try to titrate crestor if LDL still above 70  -     LIPID PANEL  -     rosuvastatin (CRESTOR) 5 mg tablet; Take 0.5 Tabs by mouth daily. Essential hypertension -Well controlled and stable. her medications were reviewed and refilled where necessary as noted below. Labs ordered as noted. Follow-up Disposition:  Return in about 6 months (around 1/20/2018).

## 2017-07-20 NOTE — MR AVS SNAPSHOT
Visit Information Date & Time Provider Department Dept. Phone Encounter #  
 7/20/2017  9:45 AM Siddharth Waller MD [facility] [address] [id number] Follow-up Instructions Return in about 6 months (around 1/20/2018). Your Appointments 5/29/2018 10:30 AM  
ESTABLISHED PATIENT with MD Lázaro Watkins ([facility]) Appt Note: ae   TP  
 [address] [facility] [address] [facility] [id number]  
[facility] 31 [address] Upcoming Health Maintenance Date Due INFLUENZA AGE 9 TO ADULT 8/1/2017 BREAST CANCER SCRN MAMMOGRAM 2/23/2018 PAP AKA CERVICAL CYTOLOGY 5/13/2020 DTaP/Tdap/Td series (2 - Td) 10/17/2021 COLONOSCOPY 9/1/2025 Allergies as of 7/20/2017  Review Complete On: 7/20/2017 By: Wilmar Collins LPN No Known Allergies Current Immunizations  Reviewed on 1/26/2017 Name Date DTAP Vaccine 10/17/2011 Influenza Vaccine 10/1/2016, 10/1/2015, 10/1/2014, 10/1/2013, 10/29/2012 Zoster Vaccine, Live 8/5/2013 Not reviewed this visit You Were Diagnosed With   
  
 Codes Comments Dyslipidemia, goal LDL below 70    -  Primary ICD-10-CM: E78.5 ICD-9-CM: 272.4 Essential hypertension     ICD-10-CM: I10 
ICD-9-CM: 401.9 Pure hypercholesterolemia     ICD-10-CM: E78.00 ICD-9-CM: 272.0 Vitals BP Pulse Temp Resp Height(growth percentile) Weight(growth percentile) 131/87 (BP 1 Location: Left arm, BP Patient Position: Sitting) 82 98.3 °F (36.8 °C) (Oral) 18 5' 7.5\" (1.715 m) 139 lb 8 oz (63.3 kg) SpO2 BMI OB Status Smoking Status 97% 21.53 kg/m2 Postmenopausal Never Smoker Vitals History BMI and BSA Data Body Mass Index Body Surface Area  
 21.53 kg/m 2 1.74 m 2 Preferred Pharmacy Pharmacy Name Phone [facility] [address], [address] [place] [address] [phone] Your Updated Medication List  
  
   
This list is accurate as of: 7/20/17 10:03 AM.  Always use your most recent med list.  
  
  
  
  
 aspirin delayed-release 81 mg tablet Take  by mouth daily. lisinopril 10 mg tablet Commonly known as:  PRINIVIL, ZESTRIL  
TAKE 1 TABLET BY MOUTH EVERY DAY  
  
 melatonin 5 mg Cap capsule Take 5 mg by mouth as needed. rosuvastatin 5 mg tablet Commonly known as:  CRESTOR Take 0.5 Tabs by mouth daily. We Performed the Following LIPID PANEL [34762 CPT(R)] Follow-up Instructions Return in about 6 months (around 1/20/2018). Introducing [facility] & HEALTH SERVICES! [address] [facility] introduces [employer] patient portal. Now you can access parts of your medical record, email your doctor's office, and request medication refills online. 1. In your internet browser, go to [email or web address] 2. Click on the First Time User? Click Here link in the Sign In box. You will see the New Member Sign Up page. 3. Enter your [employer] Access Code exactly as it appears below. You will not need to use this code after youve completed the sign-up process. If you do not sign up before the expiration date, you must request a new code. · [employer] Access Code: [email or web address] Expires: 8/21/2017 10:40 AM 
 
4. Enter the last four digits of your Social Security Number (xxxx) and Date of Birth (mm/dd/yyyy) as indicated and click Submit. You will be taken to the next sign-up page. 5. Create a Prowlt ID. This will be your [employer] login ID and cannot be changed, so think of one that is secure and easy to remember. 6. Create a [employer] password. You can change your password at any time. 7. Enter your Password Reset Question and Answer. This can be used at a later time if you forget your password. 8. Enter your e-mail address. You will receive e-mail notification when new information is available in [address] 9. Click Sign Up. You can now view and download portions of your medical record. 10. Click the Download Summary menu link to download a portable copy of your medical information. If you have questions, please visit the Frequently Asked Questions section of the TekTrak website. Remember, [employer] is NOT to be used for urgent needs. For medical emergencies, dial 911. Now available from your iPhone and Android! Please provide this summary of care documentation to your next provider. Your primary care clinician is listed as Edy Hester. If you have any questions after today's visit, please call [phone].

## 2017-07-21 LAB
CHOLEST SERPL-MCNC: 214 MG/DL (ref 100–199)
HDLC SERPL-MCNC: 98 MG/DL
INTERPRETATION, 910389: NORMAL
LDLC SERPL CALC-MCNC: 107 MG/DL (ref 0–99)
TRIGL SERPL-MCNC: 47 MG/DL (ref 0–149)
VLDLC SERPL CALC-MCNC: 9 MG/DL (ref 5–40)

## 2017-09-22 DIAGNOSIS — I10 ESSENTIAL HYPERTENSION: ICD-10-CM

## 2017-09-22 DIAGNOSIS — E78.5 DYSLIPIDEMIA, GOAL LDL BELOW 70: ICD-10-CM

## 2017-09-22 RX ORDER — LISINOPRIL 10 MG/1
TABLET ORAL
Qty: 90 TAB | Refills: 2 | Status: CANCELLED | OUTPATIENT
Start: 2017-09-22

## 2017-09-22 RX ORDER — ROSUVASTATIN CALCIUM 5 MG/1
2.5 TABLET, COATED ORAL DAILY
Qty: 1 TAB | Refills: 0 | Status: CANCELLED | OUTPATIENT
Start: 2017-09-22

## 2017-09-22 NOTE — TELEPHONE ENCOUNTER
She is down to one pill on Crestor. And for the Lisinopril she has maybe 2 weeks left but no more refills left.  [facility]

## 2017-09-24 DIAGNOSIS — E78.5 DYSLIPIDEMIA, GOAL LDL BELOW 70: ICD-10-CM

## 2017-09-24 DIAGNOSIS — I10 ESSENTIAL HYPERTENSION: ICD-10-CM

## 2017-09-24 RX ORDER — ROSUVASTATIN CALCIUM 5 MG
TABLET ORAL
Qty: 45 TAB | Refills: 2 | Status: SHIPPED | OUTPATIENT
Start: 2017-09-24 | End: 2017-09-25 | Stop reason: SDUPTHER

## 2017-09-24 RX ORDER — LISINOPRIL 10 MG/1
TABLET ORAL
Qty: 90 TAB | Refills: 2 | Status: SHIPPED | OUTPATIENT
Start: 2017-09-24 | End: 2018-08-24 | Stop reason: DRUGHIGH

## 2017-09-25 ENCOUNTER — TELEPHONE (OUTPATIENT)
Dept: INTERNAL MEDICINE CLINIC | Age: 62
End: 2017-09-25

## 2017-09-25 DIAGNOSIS — E78.5 DYSLIPIDEMIA, GOAL LDL BELOW 70: ICD-10-CM

## 2017-09-25 RX ORDER — ROSUVASTATIN CALCIUM 5 MG/1
5 TABLET, COATED ORAL
Qty: 90 TAB | Refills: 1 | Status: SHIPPED | OUTPATIENT
Start: 2017-09-25 | End: 2018-01-31

## 2017-09-25 NOTE — TELEPHONE ENCOUNTER
Pt called in advised need new script for the crestor to read take 1 tablet a day rather than the 1/2 per what Dr Gordy Talley had advised her after her last labs.  Please send in in new script

## 2017-09-28 ENCOUNTER — TELEPHONE (OUTPATIENT)
Dept: INTERNAL MEDICINE CLINIC | Age: 62
End: 2017-09-28

## 2017-09-28 NOTE — TELEPHONE ENCOUNTER
Per patient she just called and would like for her Cholesterol medication Crestor be Brand Name no Generic. And 30 day supply called into the [facility] at [phone] she would like for this to be done today please.

## 2018-01-31 ENCOUNTER — OFFICE VISIT (OUTPATIENT)
Dept: INTERNAL MEDICINE CLINIC | Age: 63
End: 2018-01-31

## 2018-01-31 VITALS
SYSTOLIC BLOOD PRESSURE: 137 MMHG | WEIGHT: 140.5 LBS | OXYGEN SATURATION: 99 % | TEMPERATURE: 98.1 F | DIASTOLIC BLOOD PRESSURE: 93 MMHG | HEIGHT: 68 IN | BODY MASS INDEX: 21.29 KG/M2 | RESPIRATION RATE: 18 BRPM | HEART RATE: 83 BPM

## 2018-01-31 DIAGNOSIS — Z00.00 PREVENTATIVE HEALTH CARE: Primary | ICD-10-CM

## 2018-01-31 DIAGNOSIS — E78.5 DYSLIPIDEMIA, GOAL LDL BELOW 70: ICD-10-CM

## 2018-01-31 DIAGNOSIS — I10 ESSENTIAL HYPERTENSION: ICD-10-CM

## 2018-01-31 DIAGNOSIS — Z78.0 POSTMENOPAUSAL: ICD-10-CM

## 2018-01-31 RX ORDER — ROSUVASTATIN CALCIUM 5 MG/1
2.5 TABLET, COATED ORAL
COMMUNITY
End: 2021-11-19 | Stop reason: SDUPTHER

## 2018-01-31 NOTE — MR AVS SNAPSHOT
[address] [facility] 
 
 
 [address] 
[phone] Patient: Samanta Humphreys MRN: [id number] XSA:[id number] Visit Information Date & Time Provider Department Dept. Phone Encounter #  
 1/31/2018 10:00 AM Cathy Garcia MD [facility] [address] [facility] [id number] Follow-up Instructions Return in about 6 months (around 7/31/2018). Your Appointments 5/29/2018 10:30 AM  
ESTABLISHED PATIENT with MD Lázaro Sen Odell ([address]) Appt Note: ae   TP  
 [address] [facility] [address] [facility] [id number]  
[facility] 31 [address] Upcoming Health Maintenance Date Due Influenza Age 5 to Adult 8/1/2017 BREAST CANCER SCRN MAMMOGRAM 2/23/2018 PAP AKA CERVICAL CYTOLOGY 5/13/2020 DTaP/Tdap/Td series (2 - Tdap) 10/17/2021 COLONOSCOPY 9/1/2025 Allergies as of 1/31/2018  Review Complete On: 1/31/2018 By: Cathy Garcia MD  
 No Known Allergies Current Immunizations  Reviewed on 1/31/2018 Name Date DTAP Vaccine 10/17/2011 Influenza Vaccine 10/1/2017, 10/1/2016, 10/1/2015, 10/1/2014, 10/1/2013, 10/29/2012 Zoster Vaccine, Live 8/5/2013 Reviewed by Cathy Garcia MD on 1/31/2018 at 10:06 AM  
 Reviewed by Cathy Garcia MD on 1/31/2018 at 10:06 AM  
 Reviewed by Cathy Garcia MD on 1/31/2018 at 10:06 AM  
You Were Diagnosed With   
  
 Codes Comments Preventative health care    -  Primary ICD-10-CM: Z00.00 ICD-9-CM: V70.0 Postmenopausal     ICD-10-CM: Z78.0 ICD-9-CM: V49.81 Essential hypertension     ICD-10-CM: I10 
ICD-9-CM: 401.9 Dyslipidemia, goal LDL below 70     ICD-10-CM: E78.5 ICD-9-CM: 272.4 Vitals BP Pulse Temp Resp Height(growth percentile) Weight(growth percentile) (!) 137/93 (BP 1 Location: Left arm, BP Patient Position: Sitting) 83 98.1 °F (36.7 °C) (Oral) 18 5' 7.5\" (1.715 m) 140 lb 8 oz (63.7 kg) SpO2 BMI OB Status Smoking Status 99% 21.68 kg/m2 Postmenopausal Never Smoker Vitals History BMI and BSA Data Body Mass Index Body Surface Area  
 21.68 kg/m 2 1.74 m 2 Preferred Pharmacy Pharmacy Name Phone [facility]  [address], [address] [place] [address] [phone] Your Updated Medication List  
  
   
This list is accurate as of: 1/31/18 10:20 AM.  Always use your most recent med list.  
  
  
  
  
 aspirin delayed-release 81 mg tablet Take  by mouth daily. CRESTOR 5 mg tablet Generic drug:  rosuvastatin Take 2.5 mg by mouth nightly. lisinopril 10 mg tablet Commonly known as:  PRINIVIL, ZESTRIL  
TAKE 1 TABLET BY MOUTH EVERY DAY  
  
 melatonin 5 mg Cap capsule Take 5 mg by mouth as needed. We Performed the Following LIPID PANEL [61695 CPT(R)] METABOLIC PANEL, BASIC [91856 CPT(R)] Follow-up Instructions Return in about 6 months (around 7/31/2018). To-Do List   
 02/28/2018 Imaging:  DEXA BONE DENSITY STUDY AXIAL Introducing [facility] & [facility] SERVICES! [employer] introduces [employer] patient portal. Now you can access parts of your medical record, email your doctor's office, and request medication refills online. 1. In your internet browser, go to [email or web address]. [email or web address] 2. Click on the First Time User? Click Here link in the Sign In box. You will see the New Member Sign Up page. 3. Enter your DinnerTime Access Code exactly as it appears below. You will not need to use this code after youve completed the sign-up process. If you do not sign up before the expiration date, you must request a new code. · [employer]inner[employer] Access Code: [email or web address] Expires: 5/1/2018 10:20 AM 
 
 4. Enter the last four digits of your Social Security Number (xxxx) and Date of Birth (mm/dd/yyyy) as indicated and click Submit. You will be taken to the next sign-up page. 5. Create a Snootlab ID. This will be your [employer] login ID and cannot be changed, so think of one that is secure and easy to remember. 6. Create a Snootlab password. You can change your password at any time. 7. Enter your Password Reset Question and Answer. This can be used at a later time if you forget your password. 8. Enter your e-mail address. You will receive e-mail notification when new information is available in [address] 9. Click Sign Up. You can now view and download portions of your medical record. 10. Click the Download Summary menu link to download a portable copy of your medical information. If you have questions, please visit the Frequently Asked Questions section of the Snoot[employer] website. Remember, [employer] is NOT to be used for urgent needs. For medical emergencies, dial 911. Now available from your iPhone and Android! Please provide this summary of care documentation to your next provider. Your primary care clinician is listed as Dheeraj Berg. If you have any questions after today's visit, please call [phone].

## 2018-01-31 NOTE — PROGRESS NOTES
HISTORY OF PRESENT ILLNESS  Sandy Corcoran is a 57 y.o. female. HPI  Sandy Corcoran is here for complete health maintenance physical exam and screening. she does have other concerns. Hypertension:  Sandy Corcoran is a 57 y.o. female with hypertension. without Chronic kidney disease stage    Medication change since last visit: No  The patient reports:  taking medications as instructed, no medication side effects noted, home BP monitoring in range of 746'L systolic over 52'F diastolic, no TIA's, no chest pain on exertion, no dyspnea on exertion, no swelling of ankles, no orthostatic dizziness or lightheadedness, no palpitations. Lifestyle modification/social history: generally follows a low fat low cholesterol diet, generally follows a low sodium diet, exercises sporadically, nonsmoker    Lab Results   Component Value Date/Time    Sodium 141 01/26/2017 11:20 AM    Potassium 4.2 01/26/2017 11:20 AM    Chloride 99 01/26/2017 11:20 AM    CO2 27 01/26/2017 11:20 AM    Glucose 86 01/26/2017 11:20 AM    BUN 16 01/26/2017 11:20 AM    Creatinine 0.88 01/26/2017 11:20 AM    BUN/Creatinine ratio 18 01/26/2017 11:20 AM    GFR est AA 81 01/26/2017 11:20 AM    GFR est non-AA 70 01/26/2017 11:20 AM    Calcium 9.9 01/26/2017 11:20 AM     Hyperlipidemia:  Sandy Corcoran is following up on her dyslipidemia. Cardiovascular risks for her are: LDL goal is under 80  prior CVA/TIA or known carotid artery disease. Currently she takes  , crestor low dose.   She cannot tolerate over 2.5mg/ d  Lab Results   Component Value Date/Time    Cholesterol, total 214 07/20/2017 10:27 AM    Cholesterol, total 222 01/26/2017 11:20 AM    Cholesterol, total 189 07/21/2016 10:36 AM    Cholesterol, total 178 12/22/2015 09:52 AM    Cholesterol, total 261 08/27/2015 09:38 AM    HDL Cholesterol 98 07/20/2017 10:27 AM    HDL Cholesterol 112 01/26/2017 11:20 AM    HDL Cholesterol 95 07/21/2016 10:36 AM    HDL Cholesterol 85 12/22/2015 09:52 AM    HDL Cholesterol 94 08/27/2015 09:38 AM    LDL, calculated 107 07/20/2017 10:27 AM    LDL, calculated 100 01/26/2017 11:20 AM    LDL, calculated 87 07/21/2016 10:36 AM    LDL, calculated 82 12/22/2015 09:52 AM    LDL, calculated 148 08/27/2015 09:38 AM    Triglyceride 47 07/20/2017 10:27 AM    Triglyceride 50 01/26/2017 11:20 AM    Triglyceride 37 07/21/2016 10:36 AM    Triglyceride 56 12/22/2015 09:52 AM    Triglyceride 94 08/27/2015 09:38 AM     Lab Results   Component Value Date/Time    ALT (SGPT) 11 12/22/2015 09:52 AM    AST (SGOT) 17 12/22/2015 09:52 AM    Alk. phosphatase 67 12/22/2015 09:52 AM    Bilirubin, direct 0.22 12/22/2015 09:52 AM    Bilirubin, total 0.9 12/22/2015 09:52 AM       Myalgias: no  Fatigue: no              Health maintenance hx includes:  Exercise: moderately active. Form of exercise: walking   Diet: generally follows a low fat low cholesterol diet, generally follows a low sodium diet, exercises sporadically, nonsmoker    Cancer screening:    Colon cancer screening:  Last Colonoscopy: 2015 and   was normal   Breast cancer screening: last mammogram 2017 and   was normal   Cervical cancer screening: last PAP/Pelvic exam: 2015   and was normal.   Osteoporosis screening:  Last BMD: never    Lab Results   Component Value Date/Time    Cholesterol, total 214 07/20/2017 10:27 AM    HDL Cholesterol 98 07/20/2017 10:27 AM    LDL, calculated 107 07/20/2017 10:27 AM    VLDL, calculated 9 07/20/2017 10:27 AM    Triglyceride 47 07/20/2017 10:27 AM         Lab Results   Component Value Date/Time    Glucose 86 01/26/2017 11:20 AM       Immunizations:     Immunization History   Administered Date(s) Administered    DTAP Vaccine 10/17/2011    Influenza Vaccine 10/29/2012, 10/01/2013, 10/01/2014, 10/01/2015, 10/01/2016, 10/01/2017    Zoster Vaccine, Live 08/05/2013      Immunization status: up to date and documented.        Social History     Social History    Marital status: SINGLE Spouse name: N/A    Number of children: N/A    Years of education: N/A     Occupational History     Crisis intake      Social History Main Topics    Smoking status: Never Smoker    Smokeless tobacco: Never Used    Alcohol use No    Drug use: No    Sexual activity: Not Currently     Other Topics Concern    Exercise Yes     walking 3 times daily. Social History Narrative     History reviewed. No pertinent surgical history. Family History   Problem Relation Age of Onset    Seizures Mother     Heart Failure Father     Other Father      PUD    Heart Disease Father     Hypertension Sister     Breast Cancer Sister 59     no BRCA testing    Cancer Sister      breast    Asthma Brother     Hypertension Sister     Asthma Other     Elevated Lipids Neg Hx     Diabetes Neg Hx      Current Outpatient Prescriptions on File Prior to Visit   Medication Sig Dispense Refill    lisinopril (PRINIVIL, ZESTRIL) 10 mg tablet TAKE 1 TABLET BY MOUTH EVERY DAY 90 Tab 2    aspirin delayed-release 81 mg tablet Take  by mouth daily.  melatonin (MELATONIN) 5 mg cap capsule Take 5 mg by mouth as needed. No current facility-administered medications on file prior to visit. .    Review of Systems   Constitutional: Negative for malaise/fatigue and weight loss. HENT: Negative for congestion and sore throat. Eyes: Negative for blurred vision and pain. Respiratory: Negative for cough, shortness of breath and wheezing. Cardiovascular: Negative for chest pain, palpitations and leg swelling. Gastrointestinal: Positive for constipation (mild occaional.  no change). Negative for abdominal pain, blood in stool, diarrhea, heartburn, melena, nausea and vomiting. Genitourinary: Negative for dysuria and hematuria. Musculoskeletal: Negative for back pain and joint pain. Skin: Negative for itching and rash. Neurological: Negative for dizziness and headaches.    Endo/Heme/Allergies: Negative for environmental allergies. Does not bruise/bleed easily. Psychiatric/Behavioral: Negative for depression and substance abuse. The patient is not nervous/anxious and does not have insomnia. Physical Exam   Constitutional: She is oriented to person, place, and time. She appears well-developed and well-nourished. No distress. BP (!) 137/93 (BP 1 Location: Left arm, BP Patient Position: Sitting)  Pulse 83  Temp 98.1 °F (36.7 °C) (Oral)   Resp 18  Ht 5' 7.5\" (1.715 m)  Wt 140 lb 8 oz (63.7 kg)  SpO2 99%  BMI 21.68 kg/m2Body mass index is 21.68 kg/(m^2). HENT:   Head: Normocephalic. Right Ear: Hearing, tympanic membrane and ear canal normal. No decreased hearing is noted. Left Ear: Hearing, tympanic membrane and ear canal normal. No decreased hearing is noted. Nose: Nose normal.   Mouth/Throat: Oropharynx is clear and moist and mucous membranes are normal. Normal dentition. No oropharyngeal exudate. Eyes: Conjunctivae and lids are normal. Pupils are equal, round, and reactive to light. No scleral icterus. Neck: Trachea normal and normal range of motion. Neck supple. No thyromegaly present. Cardiovascular: Normal rate, regular rhythm, normal heart sounds and intact distal pulses. No murmur heard. Pulmonary/Chest: Effort normal and breath sounds normal.   Abdominal: Soft. Normal appearance and bowel sounds are normal. She exhibits no distension and no mass. There is no hepatosplenomegaly. There is no tenderness. Musculoskeletal: Normal range of motion. She exhibits no edema or tenderness. Lymphadenopathy:     She has no cervical adenopathy. Neurological: She is alert and oriented to person, place, and time. Skin: Skin is warm and dry. No rash noted. She is not diaphoretic. Psychiatric: She has a normal mood and affect.  Her speech is normal and behavior is normal. Judgment and thought content normal. Cognition and memory are normal.   Nursing note and vitals reviewed. ASSESSMENT and PLAN  Diagnoses and all orders for this visit:    1. Preventative health care  she was advised to have follow up colonoscopy in 2025  Mammogram yearly. Jacob Mcadams was counseled on age-appropriate/ guideline-based risk prevention behaviors and screening for a 57y.o. year old   female . We also discussed adjustments in screening based on family history if necessary. Printed instructions for preventative screening guidelines and healthy behaviors given to patient with after visit summary. 2. Postmenopausal  -     DEXA BONE DENSITY STUDY AXIAL; Future    3. Essential hypertension - Well controlled and stable at home. her medications were reviewed and refilled where necessary as noted below. Labs ordered as noted. -     METABOLIC PANEL, BASIC    4. Dyslipidemia, goal LDL below 70 -recheck now. -     LIPID PANEL      Follow-up Disposition:  Return in about 6 months (around 7/31/2018).

## 2018-02-01 LAB
BUN SERPL-MCNC: 14 MG/DL (ref 8–27)
BUN/CREAT SERPL: 19 (ref 12–28)
CALCIUM SERPL-MCNC: 9.4 MG/DL (ref 8.7–10.3)
CHLORIDE SERPL-SCNC: 103 MMOL/L (ref 96–106)
CHOLEST SERPL-MCNC: 174 MG/DL (ref 100–199)
CO2 SERPL-SCNC: 26 MMOL/L (ref 18–29)
CREAT SERPL-MCNC: 0.75 MG/DL (ref 0.57–1)
GFR SERPLBLD CREATININE-BSD FMLA CKD-EPI: 85 ML/MIN/1.73
GFR SERPLBLD CREATININE-BSD FMLA CKD-EPI: 97 ML/MIN/1.73
GLUCOSE SERPL-MCNC: 89 MG/DL (ref 65–99)
HDLC SERPL-MCNC: 87 MG/DL
INTERPRETATION, 910389: NORMAL
LDLC SERPL CALC-MCNC: 78 MG/DL (ref 0–99)
POTASSIUM SERPL-SCNC: 4.3 MMOL/L (ref 3.5–5.2)
SODIUM SERPL-SCNC: 146 MMOL/L (ref 134–144)
TRIGL SERPL-MCNC: 46 MG/DL (ref 0–149)
VLDLC SERPL CALC-MCNC: 9 MG/DL (ref 5–40)

## 2018-02-23 ENCOUNTER — HOSPITAL ENCOUNTER (OUTPATIENT)
Dept: MAMMOGRAPHY | Age: 63
Discharge: HOME | End: 2018-02-23
Attending: INTERNAL MEDICINE
Payer: COMMERCIAL

## 2018-02-23 DIAGNOSIS — Z78.0 POSTMENOPAUSAL: ICD-10-CM

## 2018-02-23 PROCEDURE — 77080 DXA BONE DENSITY AXIAL: CPT

## 2018-05-29 ENCOUNTER — OFFICE VISIT (OUTPATIENT)
Dept: OBGYN CLINIC | Age: 63
End: 2018-05-29

## 2018-05-29 VITALS
HEIGHT: 68 IN | DIASTOLIC BLOOD PRESSURE: 70 MMHG | SYSTOLIC BLOOD PRESSURE: 110 MMHG | BODY MASS INDEX: 21.07 KG/M2 | WEIGHT: 139 LBS

## 2018-05-29 DIAGNOSIS — R45.86 MOOD CHANGE: ICD-10-CM

## 2018-05-29 DIAGNOSIS — Z01.419 ENCOUNTER FOR GYNECOLOGICAL EXAMINATION (GENERAL) (ROUTINE) WITHOUT ABNORMAL FINDINGS: Primary | ICD-10-CM

## 2018-05-29 DIAGNOSIS — R68.89 HEAT INTOLERANCE: ICD-10-CM

## 2018-05-29 DIAGNOSIS — I10 ESSENTIAL HYPERTENSION: ICD-10-CM

## 2018-05-29 NOTE — PATIENT INSTRUCTIONS
Well Visit, Women 48 to 72: Care Instructions  Your Care Instructions    Physical exams can help you stay healthy. Your doctor has checked your overall health and may have suggested ways to take good care of yourself. He or she also may have recommended tests. At home, you can help prevent illness with healthy eating, regular exercise, and other steps. Follow-up care is a key part of your treatment and safety. Be sure to make and go to all appointments, and call your doctor if you are having problems. It's also a good idea to know your test results and keep a list of the medicines you take. How can you care for yourself at home? · Reach and stay at a healthy weight. This will lower your risk for many problems, such as obesity, diabetes, heart disease, and high blood pressure. · Get at least 30 minutes of exercise on most days of the week. Walking is a good choice. You also may want to do other activities, such as running, swimming, cycling, or playing tennis or team sports. · Do not smoke. Smoking can make health problems worse. If you need help quitting, talk to your doctor about stop-smoking programs and medicines. These can increase your chances of quitting for good. · Protect your skin from too much sun. When you're outdoors from 10 a.m. to 4 p.m., stay in the shade or cover up with clothing and a hat with a wide brim. Wear sunglasses that block UV rays. Even when it's cloudy, put broad-spectrum sunscreen (SPF 30 or higher) on any exposed skin. · See a dentist one or two times a year for checkups and to have your teeth cleaned. · Wear a seat belt in the car. · Limit alcohol to 1 drink a day. Too much alcohol can cause health problems. Follow your doctor's advice about when to have certain tests. These tests can spot problems early. · Cholesterol.  Your doctor will tell you how often to have this done based on your age, family history, or other things that can increase your risk for heart attack and stroke. · Blood pressure. Have your blood pressure checked during a routine doctor visit. Your doctor will tell you how often to check your blood pressure based on your age, your blood pressure results, and other factors. · Mammogram. Ask your doctor how often you should have a mammogram, which is an X-ray of your breasts. A mammogram can spot breast cancer before it can be felt and when it is easiest to treat. · Pap test and pelvic exam. Ask your doctor how often you should have a Pap test. You may not need to have a Pap test as often as you used to. · Vision. Have your eyes checked every year or two or as often as your doctor suggests. Some experts recommend that you have yearly exams for glaucoma and other age-related eye problems starting at age 48. · Hearing. Tell your doctor if you notice any change in your hearing. You can have tests to find out how well you hear. · Diabetes. Ask your doctor whether you should have tests for diabetes. · Colon cancer. You should begin tests for colon cancer at age 48. You may have one of several tests. Your doctor will tell you how often to have tests based on your age and risk. Risks include whether you already had a precancerous polyp removed from your colon or whether your parents, sisters and brothers, or children have had colon cancer. · Thyroid disease. Talk to your doctor about whether to have your thyroid checked as part of a regular physical exam. Women have an increased chance of a thyroid problem. · Osteoporosis. You should begin tests for bone density at age 72. If you are younger than 72, ask your doctor whether you have factors that may increase your risk for this disease. You may want to have this test before age 72. · Heart attack and stroke risk. At least every 4 to 6 years, you should have your risk for heart attack and stroke assessed.  Your doctor uses factors such as your age, blood pressure, cholesterol, and whether you smoke or have diabetes to show what your risk for a heart attack or stroke is over the next 10 years. When should you call for help? Watch closely for changes in your health, and be sure to contact your doctor if you have any problems or symptoms that concern you. Where can you learn more? Go to http://trenton-kristian.info/. Enter D173 in the search box to learn more about \"Well Visit, Women 50 to 72: Care Instructions. \"  Current as of: May 12, 2017  Content Version: 11.4  © 5683-8220 Healthwise, [employer]. Care instructions adapted under license by [employer] (which disclaims liability or warranty for this information). If you have questions about a medical condition or this instruction, always ask your healthcare professional. [address] any warranty or liability for your use of this information.

## 2018-05-29 NOTE — PROGRESS NOTES
[facility]  [email or web address]  [phone]    Jayjay Varela MD, [address] [facility]       Annual Gynecologic Exam:   [facility] 60+  Chief Complaint   Patient presents with    Well Woman         Abdoul Sargent is a 57 y.o. No obstetric history on file. BLACK OR   female who presents for an annual exam.    She does report additional concerns today. Sleeping better. Has some \"blues\" not every day, related to hot flashes: but less severe.  +. Sexual history and Contraception:  History   Sexual Activity    Sexual activity: Not Currently     She does not reports new sexual partner(s) in the last year. Preventive Medicine History:  Her most recent Pap smear result: normal was obtained in May 2015  Her most recent HR HPV screen was Negative obtained in 2015    She does not have a history of SALAZAR 2, 3 or cervical cancer. Breast health:  Last mammogram: was normal.   A mammogram was not scheduled for today. Breast cancer family updated: see FH. Bone health: Jose Mora She has had a bone density scan in the past.   Last bone density test results: Osteopenia. She does have a history of osteopenia/osteoporosis. Osteoporosis family history updated: see FH. Past Medical History:   Diagnosis Date    HSV (herpes simplex virus) infection     Hx of mammogram 1/2014    negative per pt    Hypercholesterolemia     Hypertension     Pap smear for cervical cancer screening 4/16/10; 5/13/15    negative, HPV negative; Negative, HPV negative     No past surgical history on file.   Family History   Problem Relation Age of Onset    Seizures Mother     Heart Failure Father     Other Father      PUD    Heart Disease Father     Hypertension Sister     Breast Cancer Sister 59     no BRCA testing    Cancer Sister      breast    Asthma Brother     Hypertension Sister     Asthma Other     Elevated Lipids Neg Hx     Diabetes Neg Hx      Social History     Social History  Marital status: SINGLE     Spouse name: N/A    Number of children: N/A    Years of education: N/A     Occupational History     Crisis intake      Social History Main Topics    Smoking status: Never Smoker    Smokeless tobacco: Never Used    Alcohol use No    Drug use: No    Sexual activity: Not Currently     Other Topics Concern    Exercise Yes     walking 3 times daily. Social History Narrative       No Known Allergies    Current Outpatient Prescriptions   Medication Sig    rosuvastatin (CRESTOR) 5 mg tablet Take 2.5 mg by mouth nightly.  lisinopril (PRINIVIL, ZESTRIL) 10 mg tablet TAKE 1 TABLET BY MOUTH EVERY DAY    aspirin delayed-release 81 mg tablet Take  by mouth daily.  melatonin (MELATONIN) 5 mg cap capsule Take 5 mg by mouth as needed. No current facility-administered medications for this visit.         Patient Active Problem List   Diagnosis Code    Panic disorder F41.0    Dysthymia F34.1    FH: breast cancer in first degree relative Z80.3    FH: brain cancer Z80.8    FH: breast cancer Z80.3    Elevated BP [id number]    Carotid artery calcification I65.29    Essential hypertension I10    Dyslipidemia, goal LDL below 70 E78.5       Review of Systems - History obtained from the patient  Constitutional: negative for weight loss, fever, night sweats  HEENT: negative for hearing loss, earache, congestion, snoring, sorethroat  CV: negative for chest pain, palpitations, edema  Resp: negative for cough, shortness of breath, wheezing  GI: negative for change in bowel habits, abdominal pain, black or bloody stools  : negative for frequency, dysuria, hematuria, vaginal discharge  MSK: negative for back pain, joint pain, muscle pain  Breast: negative for breast lumps, nipple discharge, galactorrhea  Skin :negative for itching, rash, hives  Neuro: negative for dizziness, headache, confusion, weakness  Psych: negative for anxiety, depression, change in mood  Heme/lymph: negative for bleeding, bruising, pallor    Physical Exam  Visit Vitals    /70    Ht 5' 7.5\" (1.715 m)    Wt 139 lb (63 kg)    BMI 21.45 kg/m2       Constitutional  · Appearance: well-nourished, well developed, alert, in no acute distress    HENT  · Head and Face: appears normal    Neck  · Inspection/Palpation: normal appearance, no masses or tenderness  · Lymph Nodes: no lymphadenopathy present  · Thyroid: gland size normal, nontender, no nodules or masses present on palpation    Chest  · Respiratory Effort: breathing unlabored  · Auscultation: normal breath sounds    Cardiovascular  · Heart:  · Auscultation: regular rate and rhythm without murmur    Breasts  · Inspection of Breasts: breasts symmetrical, no skin changes, no discharge present, nipple appearance normal, no skin retraction present  · Palpation of Breasts and Axillae: no masses present on palpation, no breast tenderness  · Axillary Lymph Nodes: no lymphadenopathy present    Gastrointestinal  · Abdominal Examination: abdomen non-tender to palpation, normal bowel sounds, no masses present  · Liver and spleen: no hepatomegaly present, spleen not palpable  · Hernias: no hernias identified    Genitourinary  · External Genitalia: normal appearance for age, no discharge present, no tenderness present, no inflammatory lesions present, no masses present, without atrophy present  · Vagina: normal vaginal vault without central or paravaginal defects, no discharge present, no inflammatory lesions present, no masses present  · Bladder: non-tender to palpation  · Urethra: appears normal  · Cervix: normal   · Uterus: normal size, shape and consistency  · Adnexa: no adnexal tenderness present, no adnexal masses present  · Perineum: perineum within normal limits, no evidence of trauma, no rashes or skin lesions present  · Anus: anus within normal limits, no hemorrhoids present  · Inguinal Lymph Nodes: no lymphadenopathy present    Skin  · General Inspection: no rash, no lesions identified    Neurologic/Psychiatric  · Mental Status:  · Orientation: grossly oriented to person, place and time  · Mood and Affect: mood normal, affect appropriate    Assessment:  57 y.o. No obstetric history on file. for well woman exam  Encounter Diagnoses   Name Primary?  Encounter for gynecological examination (general) (routine) without abnormal findings Yes    Essential hypertension     Heat intolerance     Mood change ([facility] [address]        Plan:  The patient was counseled about diet, exercise, healthy lifestyle  We discussed current self breast exam and mammogram recommendations  We discussed current pap smear and HR HPV testing guidelines  We recommend follow up in one year for routine annual gynecologic exam or sooner if needed  We recommend follow up with a primary care physician for any chronic medical problems or non-gynecologic concerns    We discussed calcium/vitamin D/weight bearing exercise and osteoporosis prevention and bone density screening recommendations. Handouts were given to the patient    Disc options for mood/hot flashes, ? Neema Davidson, pt will observe through summer since more active then  FU if NI     Folllow up:  [x] return for annual well woman exam in one year or sooner if she is having problems  [] follow up and ultrasound  [] mammogram  [] 6 months  [] 6 weeks   []     No orders of the defined types were placed in this encounter. No results found for any visits on 05/29/18.

## 2018-05-29 NOTE — MR AVS SNAPSHOT
[address] [facility] [address] [facility] 
[phone] Patient: Marilee Pulliam MRN: [id number] RHD:1[birth year]55[birth year]393 Visit Information Date & Time Provider Department Dept. Phone Encounter #  
 5/29/2018 10:30 AM Betzaida Luna MD Lázaro Bain [phone] [id number] Your Appointments 7/31/2018  9:45 AM  
ROUTINE CARE with Umair Garibay MD  
[facility]: 6 month htn eval  
 [address] [facility] [address] [id number]  
[phone]  
  
   
 [address] [facility] [address] Upcoming Health Maintenance Date Due Influenza Age 5 to Adult 8/1/2018 BREAST CANCER SCRN MAMMOGRAM 3/1/2019 PAP AKA CERVICAL CYTOLOGY 5/13/2020 COLONOSCOPY 9/1/2025 Allergies as of 5/29/2018  Review Complete On: 5/29/2018 By: Driss Headley No Known Allergies Current Immunizations  Reviewed on 1/31/2018 Name Date DTAP Vaccine 10/17/2011 Influenza Vaccine 10/1/2017, 10/1/2016, 10/1/2015, 10/1/2014, 10/1/2013, 10/29/2012 Zoster Vaccine, Live 8/5/2013 Not reviewed this visit Vitals BP Height(growth percentile) Weight(growth percentile) BMI OB Status Smoking Status 110/70 5' 7.5\" (1.715 m) 139 lb (63 kg) 21.45 kg/m2 Postmenopausal Never Smoker BMI and BSA Data Body Mass Index Body Surface Area  
 21.45 kg/m 2 1.73 m 2 Preferred Pharmacy Pharmacy Name Phone [facility] [address], [address] [facility] [phone] Your Updated Medication List  
  
   
This list is accurate as of 5/29/18 10:48 AM.  Always use your most recent med list.  
  
  
  
  
 aspirin delayed-release 81 mg tablet Take  by mouth daily. CRESTOR 5 mg tablet Generic drug:  rosuvastatin Take 2.5 mg by mouth nightly. lisinopril 10 mg tablet Commonly known as:  PRINIVIL, ZESTRIL  
TAKE 1 TABLET BY MOUTH EVERY DAY  
  
 melatonin 5 mg Cap capsule Take 5 mg by mouth as needed. Patient Instructions Well Visit, Women 48 to 72: Care Instructions Your Care Instructions Physical exams can help you stay healthy. Your doctor has checked your overall health and may have suggested ways to take good care of yourself. He or she also may have recommended tests. At home, you can help prevent illness with healthy eating, regular exercise, and other steps. Follow-up care is a key part of your treatment and safety. Be sure to make and go to all appointments, and call your doctor if you are having problems. It's also a good idea to know your test results and keep a list of the medicines you take. How can you care for yourself at home? · Reach and stay at a healthy weight. This will lower your risk for many problems, such as obesity, diabetes, heart disease, and high blood pressure. · Get at least 30 minutes of exercise on most days of the week. Walking is a good choice. You also may want to do other activities, such as running, swimming, cycling, or playing tennis or team sports. · Do not smoke. Smoking can make health problems worse. If you need help quitting, talk to your doctor about stop-smoking programs and medicines. These can increase your chances of quitting for good. · Protect your skin from too much sun. When you're outdoors from 10 a.m. to 4 p.m., stay in the shade or cover up with clothing and a hat with a wide brim. Wear sunglasses that block UV rays. Even when it's cloudy, put broad-spectrum sunscreen (SPF 30 or higher) on any exposed skin. · See a dentist one or two times a year for checkups and to have your teeth cleaned. · Wear a seat belt in the car. · Limit alcohol to 1 drink a day. Too much alcohol can cause health problems. Follow your doctor's advice about when to have certain tests.  These tests can spot problems early. · Cholesterol. Your doctor will tell you how often to have this done based on your age, family history, or other things that can increase your risk for heart attack and stroke. · Blood pressure. Have your blood pressure checked during a routine doctor visit. Your doctor will tell you how often to check your blood pressure based on your age, your blood pressure results, and other factors. · Mammogram. Ask your doctor how often you should have a mammogram, which is an X-ray of your breasts. A mammogram can spot breast cancer before it can be felt and when it is easiest to treat. · Pap test and pelvic exam. Ask your doctor how often you should have a Pap test. You may not need to have a Pap test as often as you used to. · Vision. Have your eyes checked every year or two or as often as your doctor suggests. Some experts recommend that you have yearly exams for glaucoma and other age-related eye problems starting at age 48. · Hearing. Tell your doctor if you notice any change in your hearing. You can have tests to find out how well you hear. · Diabetes. Ask your doctor whether you should have tests for diabetes. · Colon cancer. You should begin tests for colon cancer at age 48. You may have one of several tests. Your doctor will tell you how often to have tests based on your age and risk. Risks include whether you already had a precancerous polyp removed from your colon or whether your parents, sisters and brothers, or children have had colon cancer. · Thyroid disease. Talk to your doctor about whether to have your thyroid checked as part of a regular physical exam. Women have an increased chance of a thyroid problem. · Osteoporosis. You should begin tests for bone density at age 72. If you are younger than 72, ask your doctor whether you have factors that may increase your risk for this disease. You may want to have this test before age 72. · Heart attack and stroke risk. At least every 4 to 6 years, you should have your risk for heart attack and stroke assessed. Your doctor uses factors such as your age, blood pressure, cholesterol, and whether you smoke or have diabetes to show what your risk for a heart attack or stroke is over the next 10 years. When should you call for help? Watch closely for changes in your health, and be sure to contact your doctor if you have any problems or symptoms that concern you. Where can you learn more? Go to http://trenton-kristian.info/. Enter Y320 in the search box to learn more about \"Well Visit, Women 50 to 72: Care Instructions. \" Current as of: May 12, 2017 Content Version: 11.4 © 8489-6406 Healthwise, [employer]. Care instructions adapted under license by [employer] (which disclaims liability or warranty for this information). If you have questions about a medical condition or this instruction, always ask your healthcare professional. [place] [address] any warranty or liability for your use of this information. Please provide this summary of care documentation to your next provider. Your primary care clinician is listed as Brandon Bear. If you have any questions after today's visit, please call [phone].

## 2018-08-01 ENCOUNTER — TELEPHONE (OUTPATIENT)
Dept: INTERNAL MEDICINE CLINIC | Age: 63
End: 2018-08-01

## 2018-08-01 NOTE — TELEPHONE ENCOUNTER
----- Message from Neil Arias sent at 8/1/2018  2:04 PM EDT -----  Regarding: Dr. Damián Thompson        Pt is requesting a call back regarding an earlier appt than 8/24/18 for a vaccine needed prior to traveling outside of the country 10/2/18, contact pt to discuss options. Pt states \"she would prefer the Rx \"Vivotif Oral (Typhoid Vaccine)\" to be sent to [facility] [phone] Dr. Naty Byrnes contact [phone] or (Justen Beauchamp) [phone].

## 2018-08-02 NOTE — TELEPHONE ENCOUNTER
----- Message from Sydney Mcgovern sent at 8/2/2018  3:33 PM EDT -----  Regarding: Dr. Sharon Ramsey        Pt is returning a missed call. Best contact [phone].

## 2018-08-02 NOTE — TELEPHONE ENCOUNTER
LM requesting a return call. The appointment patient was given is the next available. She is okay with waiting until then being that her trip isn't until the end of October.

## 2018-08-24 ENCOUNTER — OFFICE VISIT (OUTPATIENT)
Dept: INTERNAL MEDICINE CLINIC | Age: 63
End: 2018-08-24

## 2018-08-24 VITALS
BODY MASS INDEX: 21.07 KG/M2 | RESPIRATION RATE: 18 BRPM | HEIGHT: 68 IN | SYSTOLIC BLOOD PRESSURE: 145 MMHG | DIASTOLIC BLOOD PRESSURE: 83 MMHG | TEMPERATURE: 98.9 F | HEART RATE: 108 BPM | OXYGEN SATURATION: 100 % | WEIGHT: 139 LBS

## 2018-08-24 DIAGNOSIS — M85.88 OSTEOPENIA OF OTHER SITE: ICD-10-CM

## 2018-08-24 DIAGNOSIS — Z23 ENCOUNTER FOR IMMUNIZATION: ICD-10-CM

## 2018-08-24 DIAGNOSIS — I10 ESSENTIAL HYPERTENSION: Primary | ICD-10-CM

## 2018-08-24 DIAGNOSIS — Z71.84 TRAVEL ADVICE ENCOUNTER: ICD-10-CM

## 2018-08-24 RX ORDER — LISINOPRIL 20 MG/1
20 TABLET ORAL DAILY
Qty: 90 TAB | Refills: 1 | Status: SHIPPED | OUTPATIENT
Start: 2018-08-24 | End: 2018-10-29 | Stop reason: SDUPTHER

## 2018-08-24 NOTE — MR AVS SNAPSHOT
[address] [facility] 
 
 
 [address] [place] Hodgkin [address] [facility] 
[phone] Patient: Annalee Hewitt MRN: [id number] [facility]:8[birth year] Visit Information Date & Time Provider Department Dept. Phone Encounter #  
 8/24/2018  3:30 PM Vic Garcia MD [facility] [address] [facility] [id number] Follow-up Instructions Return in about 6 months (around 2/24/2019). Upcoming Health Maintenance Date Due Influenza Age 5 to Adult 8/1/2018 GLAUCOMA SCREENING Q2Y 8/22/2018 Pneumococcal 65+ Low/Medium Risk (1 of 2 - PCV13) 8/22/2018 BREAST CANCER SCRN MAMMOGRAM 3/1/2019 PAP AKA CERVICAL CYTOLOGY 5/13/2020 DTaP/Tdap/Td series (2 - Tdap) 10/17/2021 COLONOSCOPY 9/1/2025 Allergies as of 8/24/2018  Review Complete On: 8/24/2018 By: Miles Desai LPN No Known Allergies Current Immunizations  Reviewed on 1/31/2018 Name Date DTAP Vaccine 10/17/2011 Influenza Vaccine 10/1/2017, 10/1/2016, 10/1/2015, 10/1/2014, 10/1/2013, 10/29/2012 Zoster Vaccine, Live 8/5/2013 Not reviewed this visit You Were Diagnosed With   
  
 Codes Comments Essential hypertension    -  Primary ICD-10-CM: I10 
ICD-9-CM: 401.9 Osteopenia of other site     ICD-10-CM: M85.88 ICD-9-CM: 733.90 Encounter for immunization     ICD-10-CM: N76 ICD-9-CM: V03.89 Travel advice encounter     ICD-10-CM: Z71.89 ICD-9-CM: V65.49 Vitals BP Pulse Temp Resp Height(growth percentile) Weight(growth percentile) 145/83 (BP 1 Location: Right arm, BP Patient Position: Sitting) (!) 108 98.9 °F (37.2 °C) (Oral) 18 5' 7.5\" (1.715 m) 139 lb (63 kg) SpO2 BMI OB Status Smoking Status 100% 21.45 kg/m2 Postmenopausal Never Smoker Vitals History BMI and BSA Data Body Mass Index Body Surface Area  
 21.45 kg/m 2 1.73 m 2 Preferred Pharmacy Pharmacy Name Phone [phone] [address] [phone] Your Updated Medication List  
  
   
This list is accurate as of 18  3:53 PM.  Always use your most recent med list.  
  
  
  
  
 aspirin delayed-release 81 mg tablet Take  by mouth daily. CRESTOR 5 mg tablet Generic drug:  rosuvastatin Take 2.5 mg by mouth nightly. lisinopril 20 mg tablet Commonly known as:  Arnold Files Take 1 Tab by mouth daily. melatonin 5 mg Cap capsule Take 5 mg by mouth as needed. pneumococcal 13 guy conj dip 0.5 mL Syrg injection Commonly known as:  PREVNAR-13  
0.5 mL by IntraMUSCular route once for 1 dose. typhoid vaccin,live,attenuated SR capsule Take 1 Cap by mouth every other day for 4 doses. varicella-zoster recombinant (PF) 50 mcg/0.5 mL Susr injection Commonly known as:  SHINGRIX (PF)  
0.5 mL by IntraMUSCular route once for 1 dose. Prescriptions Printed Refills  
 pneumococcal 13 guy conj dip (PREVNAR-13) 0.5 mL syrg injection 0 Si.5 mL by IntraMUSCular route once for 1 dose. Class: Print Route: IntraMUSCular  
 varicella-zoster recombinant, PF, (SHINGRIX, PF,) 50 mcg/0.5 mL susr injection 0 Si.5 mL by IntraMUSCular route once for 1 dose. Class: Print Route: IntraMUSCular  
 typhoid vaccin,live,attenuated SR capsule 0 Sig: Take 1 Cap by mouth every other day for 4 doses. Class: Print Route: Oral  
  
Prescriptions Sent to Pharmacy Refills  
 lisinopril (PRINIVIL, ZESTRIL) 20 mg tablet 1 Sig: Take 1 Tab by mouth daily. Class: Normal  
 Pharmacy: [address] #: [phone] Route: Oral  
  
We Performed the Following METABOLIC PANEL, BASIC [42456 CPT(R)] VITAMIN D, 25 HYDROXY [id number] CPT(R)] Follow-up Instructions Return in about 6 months (around 2019). Please provide this summary of care documentation to your next provider. Your primary care clinician is listed as Li Zazueta. If you have any questions after today's visit, please call [phone].

## 2018-08-24 NOTE — PROGRESS NOTES
HISTORY OF PRESENT ILLNESS  Carmine Johns is a 70 y.o. female. HPI  Hypertension:  Carmine Johns is a 70 y.o. female with hypertension. without Chronic kidney disease stage    Medication change since last visit: No  The patient reports:  taking medications as instructed, no medication side effects noted, home BP monitoring in range of 087-850'C systolic over 82'W diastolic, no chest pain on exertion, no dyspnea on exertion, no swelling of ankles, no orthostatic dizziness or lightheadedness, no palpitations. Lifestyle modification/social history: generally follows a low fat low cholesterol diet, generally follows a low sodium diet, exercises regularly, nonsmoker    Lab Results   Component Value Date/Time    Sodium 146 (H) 01/31/2018 11:01 AM    Potassium 4.3 01/31/2018 11:01 AM    Chloride 103 01/31/2018 11:01 AM    CO2 26 01/31/2018 11:01 AM    Glucose 89 01/31/2018 11:01 AM    BUN 14 01/31/2018 11:01 AM    Creatinine 0.75 01/31/2018 11:01 AM    BUN/Creatinine ratio 19 01/31/2018 11:01 AM    GFR est AA 97 01/31/2018 11:01 AM    GFR est non-AA 85 01/31/2018 11:01 AM    Calcium 9.4 01/31/2018 11:01 AM         H  ROS    Physical Exam  Visit Vitals    /83 (BP 1 Location: Right arm, BP Patient Position: Sitting)    Pulse (!) 108    Temp 98.9 °F (37.2 °C) (Oral)    Resp 18    Ht 5' 7.5\" (1.715 m)    Wt 139 lb (63 kg)    SpO2 100%    BMI 21.45 kg/m2       ASSESSMENT and PLAN  Diagnoses and all orders for this visit:    1. Essential hypertension - not quite to goal.  Increase lisinopril to:  -     lisinopril (PRINIVIL, ZESTRIL) 20 mg tablet; Take 1 Tab by mouth daily. Log blood pressures at home while sitting, relaxed 3-5 times weekly and bring to next visit. Pt educated on goal BP of 130/80 on average or lower.   Call office as soon as possible if BP's over 140/90 or below 110/50 on multiple occasions and/or with symptoms of dizziness, chest pain, shortness of breath, headache or ankle swelling. Recheck log and bp here in 6 month(s).    -     METABOLIC PANEL, BASIC    2. Osteopenia of other site  -     VITAMIN D, 25 HYDROXY    3. Encounter for immunization  -     pneumococcal 13 guy conj dip (PREVNAR-13) 0.5 mL syrg injection; 0.5 mL by IntraMUSCular route once for 1 dose.  -     varicella-zoster recombinant, PF, (SHINGRIX, PF,) 50 mcg/0.5 mL susr injection; 0.5 mL by IntraMUSCular route once for 1 dose. 4. Travel advice encounter - traveling to [facility] next month. Needs typhoid vaccine. -     typhoid vaccin,live,attenuated SR capsule; Take 1 Cap by mouth every other day for 4 doses. Follow-up Disposition:  Return in about 6 months (around 2/24/2019).

## 2018-08-25 LAB
25(OH)D3+25(OH)D2 SERPL-MCNC: 22.8 NG/ML (ref 30–100)
BUN SERPL-MCNC: 14 MG/DL (ref 8–27)
BUN/CREAT SERPL: 17 (ref 12–28)
CALCIUM SERPL-MCNC: 9.4 MG/DL (ref 8.7–10.3)
CHLORIDE SERPL-SCNC: 104 MMOL/L (ref 96–106)
CO2 SERPL-SCNC: 22 MMOL/L (ref 20–29)
CREAT SERPL-MCNC: 0.83 MG/DL (ref 0.57–1)
GLUCOSE SERPL-MCNC: 98 MG/DL (ref 65–99)
POTASSIUM SERPL-SCNC: 4.2 MMOL/L (ref 3.5–5.2)
SODIUM SERPL-SCNC: 141 MMOL/L (ref 134–144)

## 2019-05-30 ENCOUNTER — OFFICE VISIT (OUTPATIENT)
Dept: OBGYN CLINIC | Age: 64
End: 2019-05-30

## 2019-05-30 VITALS
WEIGHT: 140.6 LBS | BODY MASS INDEX: 21.31 KG/M2 | DIASTOLIC BLOOD PRESSURE: 78 MMHG | SYSTOLIC BLOOD PRESSURE: 116 MMHG | HEIGHT: 68 IN

## 2019-05-30 DIAGNOSIS — Z01.419 ENCOUNTER FOR GYNECOLOGICAL EXAMINATION (GENERAL) (ROUTINE) WITHOUT ABNORMAL FINDINGS: Primary | ICD-10-CM

## 2019-05-30 DIAGNOSIS — Z12.4 SCREENING FOR CERVICAL CANCER: ICD-10-CM

## 2019-05-30 NOTE — PROGRESS NOTES
[address] [facility]  [email or web address]  [phone]    Britt Luis MD, [address] [facility]       Annual Gynecologic Exam:   [facility] 60+  Chief Complaint   Patient presents with    Well Woman         Kandice Barrios is a 70 y.o. No obstetric history on file. BLACK OR   female who presents for an annual exam.    She does not report additional concerns today. Hot flashes less bothersome. Sexual history and Contraception:  Social History     Substance and Sexual Activity   Sexual Activity Not Currently     She does not reports new sexual partner(s) in the last year. Preventive Medicine History:  Her most recent Pap smear result: normal was obtained in May 2015  Her most recent HR HPV screen was Negative obtained in 2015    She does not have a history of SALAZAR 2, 3 or cervical cancer. Breast health:  Last mammogram: approximate date 4-16-19 and was normal.   A mammogram was not scheduled for today. Breast cancer family updated: see . Bone health: Tiara Bird She has had a bone density scan in the past, most recently 2018. Last bone density test results: abnormal findings. She does have a history of osteopenia/osteoporosis. Osteoporosis family history updated: see FH. Past Medical History:   Diagnosis Date    H/O bone density study 2018    HSV (herpes simplex virus) infection     Hx of mammogram 1/2014    negative per pt    Hypercholesterolemia     Hypertension     Pap smear for cervical cancer screening 4/16/10; 5/13/15    negative, HPV negative; Negative, HPV negative     History reviewed. No pertinent surgical history.   Family History   Problem Relation Age of Onset    Seizures Mother     Heart Failure Father     Other Father         PUD    Heart Disease Father     Hypertension Sister     Breast Cancer Sister 59        no BRCA testing    Cancer Sister         breast    Asthma Brother     Hypertension Sister     Asthma Other     Other Son     Heart defect Son     Elevated Lipids Neg Hx     Diabetes Neg Hx      Social History     Socioeconomic History    Marital status: SINGLE     Spouse name: Not on file    Number of children: Not on file    Years of education: Not on file    Highest education level: Not on file   Occupational History    Occupation:  Crisis intake   Social Needs    Financial resource strain: Not on file    Food insecurity:     Worry: Not on file     Inability: Not on file    Transportation needs:     Medical: Not on file     Non-medical: Not on file   Tobacco Use    Smoking status: Never Smoker    Smokeless tobacco: Never Used   Substance and Sexual Activity    Alcohol use: No     Alcohol/week: 0.0 oz    Drug use: No    Sexual activity: Not Currently   Lifestyle    Physical activity:     Days per week: Not on file     Minutes per session: Not on file    Stress: Not on file   Relationships    Social connections:     Talks on phone: Not on file     Gets together: Not on file     Attends Yarsanism service: Not on file     Active member of club or organization: Not on file     Attends meetings of clubs or organizations: Not on file     Relationship status: Not on file    Intimate partner violence:     Fear of current or ex partner: Not on file     Emotionally abused: Not on file     Physically abused: Not on file     Forced sexual activity: Not on file   Other Topics Concern     Service Not Asked    Blood Transfusions Not Asked    Caffeine Concern Not Asked    Occupational Exposure Not Asked   [place] Hazards Not Asked    Sleep Concern Not Asked    Stress Concern Not Asked    Weight Concern Not Asked    Special Diet Not Asked    Back Care Not Asked    Exercise Yes     Comment: walking 3 times daily.     Bike Helmet Not Asked   [address] Not Asked    Self-Exams Not Asked   Social History Narrative    Not on file       No Known Allergies    Current Outpatient Medications   Medication Sig    ergocalciferol, vitamin D2, (VITAMIN D2 PO) Take  by mouth.  rosuvastatin (CRESTOR) 5 mg tablet TAKE 1/2 TABLET BY MOUTH EVERY NIGHT AT BEDTIME    lisinopril (PRINIVIL, ZESTRIL) 20 mg tablet Take 1 Tab by mouth daily.  aspirin delayed-release 81 mg tablet Take  by mouth daily.  melatonin 5 mg cap capsule Take 5 mg by mouth as needed.  rosuvastatin (CRESTOR) 5 mg tablet Take 2.5 mg by mouth nightly. No current facility-administered medications for this visit.         Patient Active Problem List   Diagnosis Code    Panic disorder F41.0    Dysthymia F34.1    FH: breast cancer in first degree relative Z80.3    FH: brain cancer Z80.10    FH: breast cancer Z80.3    Carotid artery calcification I65.29    Essential hypertension I10    Dyslipidemia, goal LDL below 70 E78.5       Review of Systems - History obtained from the patient  Constitutional: negative for weight loss, fever, night sweats  HEENT: negative for hearing loss, earache, congestion, snoring, sorethroat  CV: negative for chest pain, palpitations, edema  Resp: negative for cough, shortness of breath, wheezing  GI: negative for change in bowel habits, abdominal pain, black or bloody stools  : negative for frequency, dysuria, hematuria, vaginal discharge  MSK: negative for back pain, joint pain, muscle pain  Breast: negative for breast lumps, nipple discharge, galactorrhea  Skin :negative for itching, rash, hives  Neuro: negative for dizziness, headache, confusion, weakness  Psych: negative for anxiety, depression, change in mood  Heme/lymph: negative for bleeding, bruising, pallor    Physical Exam  Visit Vitals  /78 (BP 1 Location: Left arm, BP Patient Position: Sitting)   Ht 5' 7.5\" (1.715 m)   Wt 140 lb 9.6 oz (63.8 kg)   BMI 21.70 kg/m²       Constitutional  · Appearance: well-nourished, well developed, alert, in no acute distress    HENT  · Head and Face: appears normal    Neck  · Inspection/Palpation: normal appearance, no masses or tenderness  · Lymph Nodes: no lymphadenopathy present  · Thyroid: gland size normal, nontender, no nodules or masses present on palpation    Chest  · Respiratory Effort: breathing unlabored  · Auscultation: normal breath sounds    Cardiovascular  · Heart:  · Auscultation: regular rate and rhythm without murmur    Breasts  · Inspection of Breasts: breasts symmetrical, no skin changes, no discharge present, nipple appearance normal, no skin retraction present  · Palpation of Breasts and Axillae: no masses present on palpation, no breast tenderness  · Axillary Lymph Nodes: no lymphadenopathy present    Gastrointestinal  · Abdominal Examination: abdomen non-tender to palpation, normal bowel sounds, no masses present  · Liver and spleen: no hepatomegaly present, spleen not palpable  · Hernias: no hernias identified    Genitourinary  · External Genitalia: normal appearance for age, no discharge present, no tenderness present, no inflammatory lesions present, no masses present, with mild atrophy present  · Vagina: normal vaginal vault without central or paravaginal defects, no discharge present, no inflammatory lesions present, no masses present  · Bladder: non-tender to palpation  · Urethra: appears normal  · Cervix: normal   · Uterus: normal size, shape and consistency  · Adnexa: no adnexal tenderness present, no adnexal masses present  · Perineum: perineum within normal limits, no evidence of trauma, no rashes or skin lesions present  · Anus: anus within normal limits, no hemorrhoids present  · Inguinal Lymph Nodes: no lymphadenopathy present    Skin  · General Inspection: no rash, no lesions identified    Neurologic/Psychiatric  · Mental Status:  · Orientation: grossly oriented to person, place and time  · Mood and Affect: mood normal, affect appropriate    Assessment:  70 y.o. No obstetric history on file. for well woman exam  Encounter Diagnoses   Name Primary?     Screening for cervical cancer     Encounter for gynecological examination (general) (routine) without abnormal findings Yes       Plan:  The patient was counseled about diet, exercise, healthy lifestyle  We discussed current self breast exam and mammogram recommendations  We discussed current pap smear and HR HPV testing guidelines  We recommend follow up in one year for routine annual gynecologic exam or sooner if needed  We recommend follow up with a primary care physician for any chronic medical problems or non-gynecologic concerns    We discussed calcium/vitamin D/weight bearing exercise and osteoporosis prevention and bone density screening recommendations. Handouts were given to the patient       Folllow up:  [x] return for annual well woman exam in one year or sooner if she is having problems  [] follow up and ultrasound  [] mammogram  [] 6 months  [] 6 weeks   []     Orders Placed This Encounter    PAP IG, HPV AND RFX HPV 12/77,72([id number])       No results found for any visits on 05/30/19.

## 2019-05-30 NOTE — PATIENT INSTRUCTIONS
Well Visit, Women 48 to 72: Care Instructions Your Care Instructions Physical exams can help you stay healthy. Your doctor has checked your overall health and may have suggested ways to take good care of yourself. He or she also may have recommended tests. At home, you can help prevent illness with healthy eating, regular exercise, and other steps. Follow-up care is a key part of your treatment and safety. Be sure to make and go to all appointments, and call your doctor if you are having problems. It's also a good idea to know your test results and keep a list of the medicines you take. How can you care for yourself at home? · Reach and stay at a healthy weight. This will lower your risk for many problems, such as obesity, diabetes, heart disease, and high blood pressure. · Get at least 30 minutes of exercise on most days of the week. Walking is a good choice. You also may want to do other activities, such as running, swimming, cycling, or playing tennis or team sports. · Do not smoke. Smoking can make health problems worse. If you need help quitting, talk to your doctor about stop-smoking programs and medicines. These can increase your chances of quitting for good. · Protect your skin from too much sun. When you're outdoors from 10 a.m. to 4 p.m., stay in the shade or cover up with clothing and a hat with a wide brim. Wear sunglasses that block UV rays. Even when it's cloudy, put broad-spectrum sunscreen (SPF 30 or higher) on any exposed skin. · See a dentist one or two times a year for checkups and to have your teeth cleaned. · Wear a seat belt in the car. · Limit alcohol to 1 drink a day. Too much alcohol can cause health problems. Follow your doctor's advice about when to have certain tests. These tests can spot problems early. · Cholesterol.  Your doctor will tell you how often to have this done based on your age, family history, or other things that can increase your risk for heart attack and stroke. · Blood pressure. Have your blood pressure checked during a routine doctor visit. Your doctor will tell you how often to check your blood pressure based on your age, your blood pressure results, and other factors. · Mammogram. Ask your doctor how often you should have a mammogram, which is an X-ray of your breasts. A mammogram can spot breast cancer before it can be felt and when it is easiest to treat. · Pap test and pelvic exam. Ask your doctor how often you should have a Pap test. You may not need to have a Pap test as often as you used to. · Vision. Have your eyes checked every year or two or as often as your doctor suggests. Some experts recommend that you have yearly exams for glaucoma and other age-related eye problems starting at age 48. · Hearing. Tell your doctor if you notice any change in your hearing. You can have tests to find out how well you hear. · Diabetes. Ask your doctor whether you should have tests for diabetes. · Colon cancer. You should begin tests for colon cancer at age 48. You may have one of several tests. Your doctor will tell you how often to have tests based on your age and risk. Risks include whether you already had a precancerous polyp removed from your colon or whether your parents, sisters and brothers, or children have had colon cancer. · Thyroid disease. Talk to your doctor about whether to have your thyroid checked as part of a regular physical exam. Women have an increased chance of a thyroid problem. · Osteoporosis. You should begin tests for bone density at age 72. If you are younger than 72, ask your doctor whether you have factors that may increase your risk for this disease. You may want to have this test before age 72. · Heart attack and stroke risk. At least every 4 to 6 years, you should have your risk for heart attack and stroke assessed.  Your doctor uses factors such as your age, blood pressure, cholesterol, and whether you smoke or have diabetes to show what your risk for a heart attack or stroke is over the next 10 years. When should you call for help? Watch closely for changes in your health, and be sure to contact your doctor if you have any problems or symptoms that concern you. Where can you learn more? Go to http://trenton-kristian.info/. Enter U866 in the search box to learn more about \"Well Visit, Women 50 to 72: Care Instructions. \" Current as of: March 28, 2018 Content Version: 11.9 © 2363-1312 [employer]. Care instructions adapted under license by [employer] (which disclaims liability or warranty for this information). If you have questions about a medical condition or this instruction, always ask your healthcare professional. [address] any warranty or liability for your use of this information.

## 2019-06-03 LAB
CYTOLOGIST CVX/VAG CYTO: NORMAL
CYTOLOGY CVX/VAG DOC CYTO: NORMAL
CYTOLOGY CVX/VAG DOC THIN PREP: NORMAL
CYTOLOGY HISTORY:: NORMAL
DX ICD CODE: NORMAL
HPV I/H RISK 1 DNA CVX QL PROBE+SIG AMP: NEGATIVE
Lab: NORMAL
OTHER STN SPEC: NORMAL
STAT OF ADQ CVX/VAG CYTO-IMP: NORMAL

## 2020-02-28 ENCOUNTER — HOSPITAL ENCOUNTER (OUTPATIENT)
Dept: MAMMOGRAPHY | Age: 65
Discharge: HOME | End: 2020-02-28
Attending: INTERNAL MEDICINE
Payer: COMMERCIAL

## 2020-02-28 DIAGNOSIS — M85.80 OSTEOPENIA: ICD-10-CM

## 2020-02-28 PROCEDURE — 77080 DXA BONE DENSITY AXIAL: CPT

## 2020-10-16 NOTE — PATIENT INSTRUCTIONS
Well Visit, Over 72: Care Instructions  Your Care Instructions     Physical exams can help you stay healthy. Your doctor has checked your overall health and may have suggested ways to take good care of yourself. He or she also may have recommended tests. At home, you can help prevent illness with healthy eating, regular exercise, and other steps. Follow-up care is a key part of your treatment and safety. Be sure to make and go to all appointments, and call your doctor if you are having problems. It's also a good idea to know your test results and keep a list of the medicines you take. How can you care for yourself at home? · Reach and stay at a healthy weight. This will lower your risk for many problems, such as obesity, diabetes, heart disease, and high blood pressure. · Get at least 30 minutes of exercise on most days of the week. Walking is a good choice. You also may want to do other activities, such as running, swimming, cycling, or playing tennis or team sports. · Do not smoke. Smoking can make health problems worse. If you need help quitting, talk to your doctor about stop-smoking programs and medicines. These can increase your chances of quitting for good. · Protect your skin from too much sun. When you're outdoors from 10 a.m. to 4 p.m., stay in the shade or cover up with clothing and a hat with a wide brim. Wear sunglasses that block UV rays. Even when it's cloudy, put broad-spectrum sunscreen (SPF 30 or higher) on any exposed skin. · See a dentist one or two times a year for checkups and to have your teeth cleaned. · Wear a seat belt in the car. Follow your doctor's advice about when to have certain tests. These tests can spot problems early. For men and women  · Cholesterol. Your doctor will tell you how often to have this done based on your overall health and other things that can increase your risk for heart attack and stroke. · Blood pressure.  Have your blood pressure checked during a routine doctor visit. Your doctor will tell you how often to check your blood pressure based on your age, your blood pressure results, and other factors. · Diabetes. Ask your doctor whether you should have tests for diabetes. · Vision. Experts recommend that you have yearly exams for glaucoma and other age-related eye problems. · Hearing. Tell your doctor if you notice any change in your hearing. You can have tests to find out how well you hear. · Colon cancer tests. Keep having colon cancer tests as your doctor recommends. You can have one of several types of tests. · Heart attack and stroke risk. At least every 4 to 6 years, you should have your risk for heart attack and stroke assessed. Your doctor uses factors such as your age, blood pressure, cholesterol, and whether you smoke or have diabetes to show what your risk for a heart attack or stroke is over the next 10 years. · Osteoporosis. Talk to your doctor about whether you should have a bone density test to find out whether you have thinning bones. Ask your doctor if you need to take a calcium plus vitamin D supplement. You may be able to get enough calcium and vitamin D through your diet. For women  · Pap test and pelvic exam. You may no longer need a Pap test. Talk with your doctor about whether to stop or continue to have Pap tests. · Breast exam and mammogram. Ask how often you should have a mammogram, which is an X-ray of your breasts. A mammogram can spot breast cancer before it can be felt and when it is easiest to treat. · Thyroid disease. Talk to your doctor about whether to have your thyroid checked as part of a regular physical exam. Women have an increased chance of a thyroid problem. For men  · Prostate exam. Talk to your doctor about whether you should have a blood test (called a PSA test) for prostate cancer.  Experts recommend that you discuss the benefits and risks of the test with your doctor before you decide whether to have this test. Some experts say that men ages 79 and older no longer need testing. · Abdominal aortic aneurysm. Ask your doctor whether you should have a test to check for an aneurysm. You may need a test if you ever smoked or if your parent, brother, sister, or child has had an aneurysm. When should you call for help? Watch closely for changes in your health, and be sure to contact your doctor if you have any problems or symptoms that concern you. Where can you learn more? Go to http://www.gray.com/  Enter [id number] in the search box to learn more about \"Well Visit, Over 65: Care Instructions. \"  Current as of: May 27, 2020               Content Version: 12.6  © 6036-6600 [employer]. Care instructions adapted under license by [employer] (which disclaims liability or warranty for this information). If you have questions about a medical condition or this instruction, always ask your healthcare professional. [address] any warranty or liability for your use of this information.

## 2020-10-20 ENCOUNTER — OFFICE VISIT (OUTPATIENT)
Dept: OBGYN CLINIC | Age: 65
End: 2020-10-20
Payer: COMMERCIAL

## 2020-10-20 VITALS
HEIGHT: 68 IN | SYSTOLIC BLOOD PRESSURE: 126 MMHG | BODY MASS INDEX: 20.98 KG/M2 | DIASTOLIC BLOOD PRESSURE: 73 MMHG | HEART RATE: 89 BPM | WEIGHT: 138.4 LBS

## 2020-10-20 DIAGNOSIS — Z01.419 ENCOUNTER FOR GYNECOLOGICAL EXAMINATION (GENERAL) (ROUTINE) WITHOUT ABNORMAL FINDINGS: Primary | ICD-10-CM

## 2020-10-20 PROCEDURE — 1101F PT FALLS ASSESS-DOCD LE1/YR: CPT | Performed by: OBSTETRICS & GYNECOLOGY

## 2020-10-20 PROCEDURE — G8752 SYS BP LESS 140: HCPCS | Performed by: OBSTETRICS & GYNECOLOGY

## 2020-10-20 PROCEDURE — 1090F PRES/ABSN URINE INCON ASSESS: CPT | Performed by: OBSTETRICS & GYNECOLOGY

## 2020-10-20 PROCEDURE — G0101 CA SCREEN;PELVIC/BREAST EXAM: HCPCS | Performed by: OBSTETRICS & GYNECOLOGY

## 2020-10-20 PROCEDURE — G9899 SCRN MAM PERF RSLTS DOC: HCPCS | Performed by: OBSTETRICS & GYNECOLOGY

## 2020-10-20 PROCEDURE — G8420 CALC BMI NORM PARAMETERS: HCPCS | Performed by: OBSTETRICS & GYNECOLOGY

## 2020-10-20 PROCEDURE — G8754 DIAS BP LESS 90: HCPCS | Performed by: OBSTETRICS & GYNECOLOGY

## 2020-10-20 PROCEDURE — 3017F COLORECTAL CA SCREEN DOC REV: CPT | Performed by: OBSTETRICS & GYNECOLOGY

## 2020-10-20 PROCEDURE — G9717 DOC PT DX DEP/BP F/U NT REQ: HCPCS | Performed by: OBSTETRICS & GYNECOLOGY

## 2020-10-20 RX ORDER — HYDROCHLOROTHIAZIDE 12.5 MG/1
CAPSULE ORAL
COMMUNITY
Start: 2020-08-01

## 2020-10-20 RX ORDER — LISINOPRIL 40 MG/1
TABLET ORAL
COMMUNITY
Start: 2020-08-01

## 2020-10-20 NOTE — PROGRESS NOTES
[address] [facility]  [email or web address]  [phone]    Nasreen Myers MD, [address] [facility]       Annual Gynecologic Exam:   [facility] 60+  Chief Complaint   Patient presents with    Well Woman         Wendi Varela is a 77 y.o. No obstetric history on file. BLACK OR   female who presents for an annual exam.    She does not report additional concerns today. Sexual history and Contraception:  Social History     Substance and Sexual Activity   Sexual Activity Not Currently     She does not reports new sexual partner(s) in the last year. Preventive Medicine History:  Her most recent Pap smear result: normal was obtained in May 2019  Her most recent HR HPV screen was Negative obtained in 2019    She does not have a history of SALAZAR 2, 3 or cervical cancer. Breast health:  Last mammogram: was normal.   A mammogram was not scheduled for today. Breast cancer family updated: see FH. Bone health: Jigar Mseser She has had a bone density scan in the past.   Last bone density test results: abnormal findings - osteopenic. She does have a history of osteopenia/osteoporosis. Osteoporosis family history updated: see FH. Past Medical History:   Diagnosis Date    H/O bone density study 2018    HSV (herpes simplex virus) infection     Hx of mammogram 1/2014    negative per pt    Hypercholesterolemia     Hypertension     Pap smear for cervical cancer screening 4/16/10; 5/13/15; 5/30/19    negative, HPV negative; Negative, HPV negative; neg pap and neg HPV     History reviewed. No pertinent surgical history.   Family History   Problem Relation Age of Onset    Seizures Mother     Heart Failure Father     Other Father         PUD    Heart Disease Father     Hypertension Sister     Breast Cancer Sister 59        no BRCA testing    Cancer Sister         breast    Asthma Brother     Hypertension Sister     Asthma Other     Other Son     Heart defect Son     Elevated Lipids Neg Hx  Diabetes Neg Hx      Social History     Socioeconomic History    Marital status: SINGLE     Spouse name: Not on file    Number of children: Not on file    Years of education: Not on file    Highest education level: Not on file   Occupational History    Occupation:  Crisis intake   Social Needs    Financial resource strain: Not on file    Food insecurity     Worry: Not on file     Inability: Not on file    Transportation needs     Medical: Not on file     Non-medical: Not on file   Tobacco Use    Smoking status: Never Smoker    Smokeless tobacco: Never Used   Substance and Sexual Activity    Alcohol use: No     Alcohol/week: 0.0 standard drinks    Drug use: No    Sexual activity: Not Currently   Lifestyle    Physical activity     Days per week: Not on file     Minutes per session: Not on file    Stress: Not on file   Relationships    Social connections     Talks on phone: Not on file     Gets together: Not on file     Attends Zoroastrianism service: Not on file     Active member of club or organization: Not on file     Attends meetings of clubs or organizations: Not on file     Relationship status: Not on file    Intimate partner violence     Fear of current or ex partner: Not on file     Emotionally abused: Not on file     Physically abused: Not on file     Forced sexual activity: Not on file   Other Topics Concern     Service Not Asked    Blood Transfusions Not Asked    Caffeine Concern Not Asked    Occupational Exposure Not Asked   Link Cassidy Hazards Not Asked    Sleep Concern Not Asked    Stress Concern Not Asked    Weight Concern Not Asked    Special Diet Not Asked    Back Care Not Asked    Exercise Yes     Comment: walking 3 times daily.     Bike Helmet Not Asked    Seat Belt Not Asked    Self-Exams Not Asked   Social History Narrative    Not on file       No Known Allergies    Current Outpatient Medications   Medication Sig    hydroCHLOROthiazide (MICROZIDE) 12.5 mg capsule     ergocalciferol, vitamin D2, (VITAMIN D2 PO) Take  by mouth.  rosuvastatin (CRESTOR) 5 mg tablet TAKE 1/2 TABLET BY MOUTH EVERY NIGHT AT BEDTIME    lisinopril (PRINIVIL, ZESTRIL) 20 mg tablet Take 1 Tab by mouth daily.  aspirin delayed-release 81 mg tablet Take  by mouth daily.  lisinopriL (PRINIVIL, ZESTRIL) 40 mg tablet     rosuvastatin (CRESTOR) 5 mg tablet Take 2.5 mg by mouth nightly. No current facility-administered medications for this visit.         Patient Active Problem List   Diagnosis Code    Panic disorder F41.0    Dysthymia F34.1    FH: breast cancer in first degree relative Z80.3    FH: brain cancer Z80.10    FH: breast cancer Z80.3    Carotid artery calcification I65.29    Essential hypertension I10    Dyslipidemia, goal LDL below 70 E78.5       Review of Systems - History obtained from the patient  Constitutional: negative for weight loss, fever, night sweats  HEENT: negative for hearing loss, earache, congestion, snoring, sorethroat  CV: negative for chest pain, palpitations, edema  Resp: negative for cough, shortness of breath, wheezing  GI: negative for change in bowel habits, abdominal pain, black or bloody stools  : negative for frequency, dysuria, hematuria, vaginal discharge  MSK: negative for back pain, joint pain, muscle pain  Breast: negative for breast lumps, nipple discharge, galactorrhea  Skin :negative for itching, rash, hives  Neuro: negative for dizziness, headache, confusion, weakness  Psych: negative for anxiety, depression, change in mood  Heme/lymph: negative for bleeding, bruising, pallor    (WWE continued)    Physical Exam  Visit Vitals  /73 (BP 1 Location: Right arm, BP Patient Position: Sitting)   Pulse 89   Ht 5' 7.5\" (1.715 m)   Wt 138 lb 6.4 oz (62.8 kg)   BMI 21.36 kg/m²       Constitutional  · Appearance: well-nourished, well developed, alert, in no acute distress    HENT  · Head and Face: appears normal    Neck  · Inspection/Palpation: normal appearance, no masses or tenderness  · Lymph Nodes: no lymphadenopathy present  · Thyroid: gland size normal, nontender, no nodules or masses present on palpation    Chest  · Respiratory Effort: breathing unlabored  · Auscultation: normal breath sounds    Cardiovascular  · Heart:  · Auscultation: regular rate and rhythm without murmur    Breasts  · Inspection of Breasts: breasts symmetrical, no skin changes, no discharge present, nipple appearance normal, no skin retraction present  · Palpation of Breasts and Axillae: no masses present on palpation, no breast tenderness  · Axillary Lymph Nodes: no lymphadenopathy present    Gastrointestinal  · Abdominal Examination: abdomen non-tender to palpation, normal bowel sounds, no masses present  · Liver and spleen: no hepatomegaly present, spleen not palpable  · Hernias: no hernias identified    Genitourinary  · External Genitalia: normal appearance for age, no discharge present, no tenderness present, no inflammatory lesions present, no masses present, with mild atrophy present  · Vagina: normal vaginal vault without central or paravaginal defects, no discharge present, no inflammatory lesions present, no masses present  · Bladder: non-tender to palpation  · Urethra: appears normal  · Cervix: normal   · Uterus: normal size, shape and consistency  · Adnexa: no adnexal tenderness present, no adnexal masses present  · Perineum: perineum within normal limits, no evidence of trauma, no rashes or skin lesions present  · Anus: anus within normal limits, no hemorrhoids present  · Inguinal Lymph Nodes: no lymphadenopathy present    Skin  · General Inspection: no rash, no lesions identified    Neurologic/Psychiatric  · Mental Status:  · Orientation: grossly oriented to person, place and time  · Mood and Affect: mood normal, affect appropriate    Assessment:  77 y.o. No obstetric history on file.  for well woman exam  Encounter Diagnosis Name Primary?  Encounter for gynecological examination (general) (routine) without abnormal findings Yes       Plan:  The patient was counseled about diet, exercise, healthy lifestyle  We discussed current self breast exam and mammogram recommendations  We discussed current pap smear and HR HPV testing guidelines  We recommend follow up in one year for routine annual gynecologic exam or sooner if needed  We recommend follow up with a primary care physician for any chronic medical problems or non-gynecologic concerns    We discussed calcium/vitamin D/weight bearing exercise and osteoporosis prevention and bone density screening recommendations. Handouts were given to the patient       Folllow up:  [x] return for annual well woman exam in one year or sooner if she is having problems  [] follow up and ultrasound  [] mammogram  [] 6 months  [] 6 weeks   []     No orders of the defined types were placed in this encounter. No results found for any visits on 10/20/20.

## 2021-11-19 ENCOUNTER — OFFICE VISIT (OUTPATIENT)
Dept: OBGYN CLINIC | Age: 66
End: 2021-11-19
Payer: MEDICARE

## 2021-11-19 VITALS
DIASTOLIC BLOOD PRESSURE: 68 MMHG | HEART RATE: 89 BPM | BODY MASS INDEX: 20.59 KG/M2 | WEIGHT: 133.4 LBS | SYSTOLIC BLOOD PRESSURE: 110 MMHG

## 2021-11-19 DIAGNOSIS — Z01.419 ENCOUNTER FOR GYNECOLOGICAL EXAMINATION (GENERAL) (ROUTINE) WITHOUT ABNORMAL FINDINGS: Primary | ICD-10-CM

## 2021-11-19 DIAGNOSIS — Z80.3 FH: BREAST CANCER: ICD-10-CM

## 2021-11-19 PROCEDURE — G0101 CA SCREEN;PELVIC/BREAST EXAM: HCPCS | Performed by: OBSTETRICS & GYNECOLOGY

## 2021-11-19 PROCEDURE — G9717 DOC PT DX DEP/BP F/U NT REQ: HCPCS | Performed by: OBSTETRICS & GYNECOLOGY

## 2021-11-19 PROCEDURE — 3017F COLORECTAL CA SCREEN DOC REV: CPT | Performed by: OBSTETRICS & GYNECOLOGY

## 2021-11-19 PROCEDURE — 1090F PRES/ABSN URINE INCON ASSESS: CPT | Performed by: OBSTETRICS & GYNECOLOGY

## 2021-11-19 PROCEDURE — G8754 DIAS BP LESS 90: HCPCS | Performed by: OBSTETRICS & GYNECOLOGY

## 2021-11-19 PROCEDURE — G8420 CALC BMI NORM PARAMETERS: HCPCS | Performed by: OBSTETRICS & GYNECOLOGY

## 2021-11-19 PROCEDURE — 1101F PT FALLS ASSESS-DOCD LE1/YR: CPT | Performed by: OBSTETRICS & GYNECOLOGY

## 2021-11-19 PROCEDURE — G8752 SYS BP LESS 140: HCPCS | Performed by: OBSTETRICS & GYNECOLOGY

## 2021-11-19 NOTE — PATIENT INSTRUCTIONS
Well Visit, Ages 25 to 48: Care Instructions  Overview     Well visits can help you stay healthy. Your doctor has checked your overall health and may have suggested ways to take good care of yourself. Your doctor also may have recommended tests. At home, you can help prevent illness with healthy eating, regular exercise, and other steps. Follow-up care is a key part of your treatment and safety. Be sure to make and go to all appointments, and call your doctor if you are having problems. It's also a good idea to know your test results and keep a list of the medicines you take. How can you care for yourself at home? · Get screening tests that you and your doctor decide on. Screening helps find diseases before any symptoms appear. · Eat healthy foods. Choose fruits, vegetables, whole grains, protein, and low-fat dairy foods. Limit fat, especially saturated fat. Reduce salt in your diet. · Limit alcohol. If you are a man, have no more than 2 drinks a day or 14 drinks a week. If you are a woman, have no more than 1 drink a day or 7 drinks a week. · Get at least 30 minutes of physical activity on most days of the week. Walking is a good choice. You also may want to do other activities, such as running, swimming, cycling, or playing tennis or team sports. Discuss any changes in your exercise program with your doctor. · Reach and stay at a healthy weight. This will lower your risk for many problems, such as obesity, diabetes, heart disease, and high blood pressure. · Do not smoke or allow others to smoke around you. If you need help quitting, talk to your doctor about stop-smoking programs and medicines. These can increase your chances of quitting for good. · Care for your mental health. It is easy to get weighed down by worry and stress. Learn strategies to manage stress, like deep breathing and mindfulness, and stay connected with your family and community.  If you find you often feel sad or hopeless, talk with your doctor. Treatment can help. · Talk to your doctor about whether you have any risk factors for sexually transmitted infections (STIs). You can help prevent STIs if you wait to have sex with a new partner (or partners) until you've each been tested for STIs. It also helps if you use condoms (male or female condoms) and if you limit your sex partners to one person who only has sex with you. Vaccines are available for some STIs, such as HPV. · Use birth control if it's important to you to prevent pregnancy. Talk with your doctor about the choices available and what might be best for you. · If you think you may have a problem with alcohol or drug use, talk to your doctor. This includes prescription medicines (such as amphetamines and opioids) and illegal drugs (such as cocaine and methamphetamine). Your doctor can help you figure out what type of treatment is best for you. · Protect your skin from too much sun. When you're outdoors from 10 a.m. to 4 p.m., stay in the shade or cover up with clothing and a hat with a wide brim. Wear sunglasses that block UV rays. Even when it's cloudy, put broad-spectrum sunscreen (SPF 30 or higher) on any exposed skin. · See a dentist one or two times a year for checkups and to have your teeth cleaned. · Wear a seat belt in the car. When should you call for help? Watch closely for changes in your health, and be sure to contact your doctor if you have any problems or symptoms that concern you. Where can you learn more? Go to http://trenton-[email or web address].info/  Enter P072 in the search box to learn more about \"Well Visit, Ages 25 to 48: Care Instructions. \"  Current as of: February 11, 2021               Content Version: 13.0  © 2398-6843 Healthwise, Incorporated. Care instructions adapted under license by [employer] (which disclaims liability or warranty for this information).  If you have questions about a medical condition or this instruction, always ask your healthcare professional. [place] [address] any warranty or liability for your use of this information.

## 2021-11-19 NOTE — PROGRESS NOTES
[address] [facility]  [email or web address]  [phone]    Polo Reynolds MD, [address] [facility]       Annual Gynecologic Exam:   [facility] 60+  Chief Complaint   Patient presents with    Well Woman         Chandan Craft is a 74 y.o. No obstetric history on file. BLACK/  female who presents for an annual exam.    She reports additional concerns: None. Retired, but working again part time until the end of the year. Sexual history and Contraception:  Social History     Substance and Sexual Activity   Sexual Activity Not Currently       Preventive Medicine History:  Her most recent Pap smear result: normal was obtained in May 2019  Her most recent HR HPV screen was Negative obtained in 2019    She does not have a history of SALAZAR 2, 3 or cervical cancer. Breast health:  Last mammogram: was normal.   Breast cancer family updated: see FH. Bone health: Dolores Hi She has had a bone density scan in the past.   Last bone density test results: abnormal findings 01/13/20, osteopenia. She does have a history of osteopenia/osteoporosis. Osteoporosis family history updated: see FH. Past Medical History:   Diagnosis Date    H/O bone density study 2018    HSV (herpes simplex virus) infection     Hx of mammogram 1/2014    negative per pt    Hypercholesterolemia     Hypertension     Pap smear for cervical cancer screening 4/16/10; 5/13/15; 5/30/19    negative, HPV negative; Negative, HPV negative; neg pap and neg HPV     History reviewed. No pertinent surgical history.   Family History   Problem Relation Age of Onset    Seizures Mother     Heart Failure Father     Other Father         PUD    Heart Disease Father     Hypertension Sister     Breast Cancer Sister 59        no BRCA testing    Cancer Sister         breast    Asthma Brother     Hypertension Sister     Asthma Other     Other Son     Heart defect Son     Elevated Lipids Neg Hx     Diabetes Neg Hx      Social History Socioeconomic History    Marital status: SINGLE     Spouse name: Not on file    Number of children: Not on file    Years of education: Not on file    Highest education level: Not on file   Occupational History    Occupation:  Crisis intake   Tobacco Use    Smoking status: Never Smoker    Smokeless tobacco: Never Used   Substance and Sexual Activity    Alcohol use: No     Alcohol/week: 0.0 standard drinks    Drug use: No    Sexual activity: Not Currently   Other Topics Concern     Service Not Asked    Blood Transfusions Not Asked    Caffeine Concern Not Asked    Occupational Exposure Not Asked    Hobby Hazards Not Asked    Sleep Concern Not Asked    Stress Concern Not Asked    Weight Concern Not Asked    Special Diet Not Asked    Back Care Not Asked    Exercise Yes     Comment: walking 3 times daily.  Bike Helmet Not Asked    Seat Belt Not Asked    Self-Exams Not Asked   Social History Narrative    Not on file     Social Determinants of Health     Financial Resource Strain:     Difficulty of Paying Living Expenses: Not on file   Food Insecurity:     Worried About Running Out of Food in the Last Year: Not on file    Destiny of Food in the Last Year: Not on file   Transportation Needs:     Lack of Transportation (Medical): Not on file    Lack of Transportation (Non-Medical):  Not on file   Physical Activity:     Days of Exercise per Week: Not on file    Minutes of Exercise per Session: Not on file   Stress:     Feeling of Stress : Not on file   Social Connections:     Frequency of Communication with Friends and Family: Not on file    Frequency of Social Gatherings with Friends and Family: Not on file    Attends Muslim Services: Not on file    Active Member of Clubs or Organizations: Not on file    Attends Club or Organization Meetings: Not on file    Marital Status: Not on file   Intimate Partner Violence:     Fear of Current or Ex-Partner: Not on file    Emotionally Abused: Not on file    Physically Abused: Not on file    Sexually Abused: Not on file   Housing Stability:     Unable to Pay for Housing in the Last Year: Not on file    Number of [place] in the Last Year: Not on file    Unstable Housing in the Last Year: Not on file       No Known Allergies    Current Outpatient Medications   Medication Sig    hydroCHLOROthiazide (MICROZIDE) 12.5 mg capsule     ergocalciferol, vitamin D2, (VITAMIN D2 PO) Take  by mouth.  rosuvastatin (CRESTOR) 5 mg tablet TAKE 1/2 TABLET BY MOUTH EVERY NIGHT AT BEDTIME    lisinopril (PRINIVIL, ZESTRIL) 20 mg tablet Take 1 Tab by mouth daily.  aspirin delayed-release 81 mg tablet Take  by mouth daily.  lisinopriL (PRINIVIL, ZESTRIL) 40 mg tablet  (Patient not taking: Reported on 11/19/2021)     No current facility-administered medications for this visit.        Patient Active Problem List   Diagnosis Code    Panic disorder F41.0    Dysthymia F34.1    FH: breast cancer in first degree relative Z80.3    FH: brain cancer Z80.10    FH: breast cancer Z80.3    Carotid artery calcification I65.29    Essential hypertension I10    Dyslipidemia, goal LDL below 70 E78.5       Review of Systems - History obtained from the patient and patient filled out questionnaire   Constitutional/general, HEENT, CV, Resp, GI, MSK, Neuro, Psych, Heme/lymph, Skin, Breast ROS: no significant complaints except as noted on HPI      Physical Exam  Visit Vitals  /68   Pulse 89   Wt 133 lb 6.4 oz (60.5 kg)   BMI 20.59 kg/m²       Constitutional  · Appearance: well-nourished, well developed, alert, in no acute distress    HENT  · Head and Face: appears normal    Neck  · Inspection/Palpation: normal appearance, no masses or tenderness  · Lymph Nodes: no lymphadenopathy present  · Thyroid: gland size normal, nontender, no nodules or masses present on palpation    Chest  · Respiratory Effort: breathing unlabored  · Auscultation: normal breath sounds    Cardiovascular  · Heart:  · Auscultation: regular rate and rhythm without murmur    Breasts  · Inspection of Breasts: breasts symmetrical, no skin changes, no discharge present, nipple appearance normal, no skin retraction present  · Palpation of Breasts and Axillae: no masses present on palpation, no breast tenderness  · Axillary Lymph Nodes: no lymphadenopathy present    Gastrointestinal  · Abdominal Examination: abdomen non-tender to palpation, normal bowel sounds, no masses present  · Liver and spleen: no hepatomegaly present, spleen not palpable  · Hernias: no hernias identified    Genitourinary  · External Genitalia: normal appearance for age, no discharge present, no tenderness present, no inflammatory lesions present, no masses present, with atrophy present  · Vagina: normal vaginal vault without central or paravaginal defects, no discharge present, no inflammatory lesions present, no masses present  · Bladder: non-tender to palpation  · Urethra: appears normal  · Cervix: normal   · Uterus: normal size, shape and consistency  · Adnexa: no adnexal tenderness present, no adnexal masses present  · Perineum: perineum within normal limits, no evidence of trauma, no rashes or skin lesions present  · Anus: anus within normal limits, no hemorrhoids present  · Inguinal Lymph Nodes: no lymphadenopathy present    Skin  · General Inspection: no rash, no lesions identified    Neurologic/Psychiatric  · Mental Status:  · Orientation: grossly oriented to person, place and time  · Mood and Affect: mood normal, affect appropriate    Assessment:  74 y.o. No obstetric history on file. for well woman exam  Encounter Diagnoses   Name Primary?     Encounter for gynecological examination (general) (routine) without abnormal findings Yes    FH: breast cancer        Plan:  The patient was counseled about diet, exercise, healthy lifestyle  We discussed current self breast exam and mammogram recommendations  We discussed current pap smear and HR HPV testing guidelines  I recommended follow up in one year for routine annual gynecologic exam or sooner if needed  I recommended follow up with a primary care physician for any chronic medical problems or non-gynecologic concerns    We discussed calcium/vitamin D/weight bearing exercise and osteoporosis prevention and bone density screening recommendations. Handouts were given to the patient  Sign release for [facility]  ([facility])     Folllow up:  [x] return for annual well woman exam in one year or sooner if she is having problems  [] follow up and ultrasound  [] mammogram  [] 6 months  [] 6 weeks   []     No orders of the defined types were placed in this encounter. No results found for any visits on 11/19/21.

## 2022-03-18 PROBLEM — E78.5 DYSLIPIDEMIA, GOAL LDL BELOW 70: Status: ACTIVE | Noted: 2017-07-20

## 2022-08-25 ENCOUNTER — TRANSCRIBE ORDER (OUTPATIENT)
Dept: SCHEDULING | Age: 67
End: 2022-08-25

## 2022-08-25 DIAGNOSIS — M85.9 DISORDER OF BONE DENSITY AND STRUCTURE, UNSPECIFIED: ICD-10-CM

## 2022-08-25 DIAGNOSIS — M85.89 OTHER SPECIFIED DISORDERS OF BONE DENSITY AND STRUCTURE, MULTIPLE SITES: ICD-10-CM

## 2022-08-25 DIAGNOSIS — M85.80 OSTEOPENIA, UNSPECIFIED LOCATION: Primary | ICD-10-CM

## 2022-09-16 ENCOUNTER — HOSPITAL ENCOUNTER (OUTPATIENT)
Dept: MAMMOGRAPHY | Age: 67
Discharge: HOME | End: 2022-09-16
Attending: INTERNAL MEDICINE
Payer: MEDICARE

## 2022-09-16 DIAGNOSIS — M85.80 OSTEOPENIA, UNSPECIFIED LOCATION: ICD-10-CM

## 2022-09-16 DIAGNOSIS — M85.89 OTHER SPECIFIED DISORDERS OF BONE DENSITY AND STRUCTURE, MULTIPLE SITES: ICD-10-CM

## 2022-09-16 DIAGNOSIS — M85.9 DISORDER OF BONE DENSITY AND STRUCTURE, UNSPECIFIED: ICD-10-CM

## 2022-09-16 PROCEDURE — 77080 DXA BONE DENSITY AXIAL: CPT

## 2023-01-05 ENCOUNTER — PATIENT MESSAGE (OUTPATIENT)
Dept: OBGYN CLINIC | Age: 68
End: 2023-01-05

## 2023-01-09 ENCOUNTER — OFFICE VISIT (OUTPATIENT)
Dept: OBGYN CLINIC | Age: 68
End: 2023-01-09
Payer: MEDICARE

## 2023-01-09 VITALS
BODY MASS INDEX: 20 KG/M2 | WEIGHT: 132 LBS | HEART RATE: 81 BPM | SYSTOLIC BLOOD PRESSURE: 126 MMHG | DIASTOLIC BLOOD PRESSURE: 64 MMHG | HEIGHT: 68 IN

## 2023-01-09 DIAGNOSIS — Z12.4 CERVICAL CANCER SCREENING: ICD-10-CM

## 2023-01-09 DIAGNOSIS — Z01.419 ENCOUNTER FOR GYNECOLOGICAL EXAMINATION: Primary | ICD-10-CM

## 2023-01-09 PROCEDURE — 3078F DIAST BP <80 MM HG: CPT | Performed by: OBSTETRICS & GYNECOLOGY

## 2023-01-09 PROCEDURE — G0101 CA SCREEN;PELVIC/BREAST EXAM: HCPCS | Performed by: OBSTETRICS & GYNECOLOGY

## 2023-01-09 PROCEDURE — 3017F COLORECTAL CA SCREEN DOC REV: CPT | Performed by: OBSTETRICS & GYNECOLOGY

## 2023-01-09 PROCEDURE — 1090F PRES/ABSN URINE INCON ASSESS: CPT | Performed by: OBSTETRICS & GYNECOLOGY

## 2023-01-09 PROCEDURE — 1101F PT FALLS ASSESS-DOCD LE1/YR: CPT | Performed by: OBSTETRICS & GYNECOLOGY

## 2023-01-09 PROCEDURE — 3074F SYST BP LT 130 MM HG: CPT | Performed by: OBSTETRICS & GYNECOLOGY

## 2023-01-09 PROCEDURE — G8420 CALC BMI NORM PARAMETERS: HCPCS | Performed by: OBSTETRICS & GYNECOLOGY

## 2023-01-09 PROCEDURE — G9717 DOC PT DX DEP/BP F/U NT REQ: HCPCS | Performed by: OBSTETRICS & GYNECOLOGY

## 2023-01-09 RX ORDER — MAGNESIUM 250 MG
TABLET ORAL
COMMUNITY

## 2023-01-09 RX ORDER — UREA 10 %
LOTION (ML) TOPICAL
COMMUNITY

## 2023-01-09 NOTE — PROGRESS NOTES
[address] [facility]  [email or web address]  [phone]    Malcolm Ingram MD, [address] [facility]     Annual Gynecologic Exam:  Chief Complaint   Patient presents with    Well Woman       Tyrell Catalan is a No obstetric history on file. ,  69 y.o. female   No LMP recorded. Patient is postmenopausal.    She presents for her annual checkup. She is having significant vision loss, improving, etiology still unclear, was seen at [facility] . Per Rooming Note:  No LMP recorded. Patient is postmenopausal.  Her periods are none  She does not have dysmenorrhea. Problems: no significant problems  Birth Control: post menopausal status. Last Pap: normal obtained 5/2019  She does not have a history of SALAZAR 2, 3 or cervical cancer. Last Mammogram:  3/15/2022 . It was normal per pt  Last Bone Density: osteopenic 9/2022  Last colonoscopy: within the last 10 years WNL per pt    Sexual history and Contraception:  Social History     Substance and Sexual Activity   Sexual Activity Not Currently       Past Medical History:   Diagnosis Date    H/O bone density study 2018    HSV (herpes simplex virus) infection     Hx of mammogram 1/2014    negative per pt    Hypercholesterolemia     Hypertension     Pap smear for cervical cancer screening 4/16/10; 5/13/15; 5/30/19    negative, HPV negative; Negative, HPV negative; neg pap and neg HPV     Current Outpatient Medications   Medication Sig    magnesium 250 mg tab Take  by mouth.    melatonin 1 mg tablet Take  by mouth. hydroCHLOROthiazide (MICROZIDE) 12.5 mg capsule     ergocalciferol, vitamin D2, (VITAMIN D2 PO) Take  by mouth. rosuvastatin (CRESTOR) 5 mg tablet TAKE 1/2 TABLET BY MOUTH EVERY NIGHT AT BEDTIME    lisinopril (PRINIVIL, ZESTRIL) 20 mg tablet Take 1 Tab by mouth daily. aspirin delayed-release 81 mg tablet Take  by mouth daily.     lisinopriL (PRINIVIL, ZESTRIL) 40 mg tablet  (Patient not taking: Reported on 11/19/2021)     No current facility-administered medications for this visit. OB History   No obstetric history on file. History reviewed. No pertinent surgical history. Family History   Problem Relation Age of Onset    Seizures Mother     Heart Failure Father     Other Father         PUD    Heart Disease Father     Hypertension Sister     Breast Cancer Sister 59        no BRCA testing    Cancer Sister         breast    Asthma Brother     Hypertension Sister     Asthma Other     Other Son     Heart defect Son     Elevated Lipids Neg Hx     Diabetes Neg Hx      Social History     Socioeconomic History    Marital status: SINGLE     Spouse name: Not on file    Number of children: Not on file    Years of education: Not on file    Highest education level: Not on file   Occupational History    Occupation:  Crisis intake   Tobacco Use    Smoking status: Never    Smokeless tobacco: Never   Substance and Sexual Activity    Alcohol use: No     Alcohol/week: 0.0 standard drinks    Drug use: No    Sexual activity: Not Currently   Other Topics Concern     Service Not Asked    Blood Transfusions Not Asked    Caffeine Concern Not Asked    Occupational Exposure Not Asked    Hobby Hazards Not Asked    Sleep Concern Not Asked    Stress Concern Not Asked    Weight Concern Not Asked    Special Diet Not Asked    Back Care Not Asked    Exercise Yes     Comment: walking 3 times daily. Bike Helmet Not Asked    Seat Belt Not Asked    Self-Exams Not Asked   Social History Narrative    Not on file     Social Determinants of Health     Financial Resource Strain: Not on file   Food Insecurity: Not on file   Transportation Needs: Not on file   Physical Activity: Not on file   Stress: Not on file   Social Connections: Not on file   Intimate Partner Violence: Not on file   Housing Stability: Not on file     Tobacco History:  reports that she has never smoked.  She has never used smokeless tobacco.  Alcohol Abuse:  reports no history of alcohol use.  Drug Abuse:  reports no history of drug use. No Known Allergies    Patient Active Problem List   Diagnosis Code    Panic disorder F41.0    Dysthymia F34.1    FH: breast cancer in first degree relative Z80.3    FH: brain cancer Z80.8    FH: breast cancer Z80.3    Carotid artery calcification I65.29    Essential hypertension I10    Dyslipidemia, goal LDL below 70 E78.5       Review of Systems - History obtained from the patient and patient filled out questionnaire   Constitutional/general, HEENT, CV, Resp, GI, MSK, Neuro, Psych, Heme/lymph, Skin, Breast ROS: no significant complaints except as noted on HPI    Physical Exam  Visit Vitals  /64   Pulse 81   Ht 5' 7.5\" (1.715 m)   Wt 132 lb (59.9 kg)   BMI 20.37 kg/m²       Constitutional  Appearance: well-nourished, well developed, alert, in no acute distress    HENT  Head and Face: appears normal    Neck  Inspection/Palpation: normal appearance, no masses or tenderness  Lymph Nodes: no lymphadenopathy present  Thyroid: gland size normal, nontender, no nodules or masses present on palpation    Chest  Respiratory Effort: breathing unlabored  Auscultation: normal breath sounds    Cardiovascular  Heart:   Auscultation: regular rate and rhythm without murmur    Breasts  Inspection of Breasts: breasts symmetrical, no skin changes, no discharge present, nipple appearance normal, no skin retraction present  Palpation of Breasts and Axillae: no masses present on palpation, no breast tenderness  Axillary Lymph Nodes: no lymphadenopathy present    Gastrointestinal  Abdominal Examination: abdomen non-tender to palpation, normal bowel sounds, no masses present  Liver and spleen: no hepatomegaly present, spleen not palpable  Hernias: no hernias identified    Genitourinary  External Genitalia: normal appearance for age, no discharge present, no tenderness present, no inflammatory lesions present, no masses present  Vagina: normal vaginal vault without central or paravaginal defects, minimal discharge present, no inflammatory lesions present, no masses present  Bladder: non-tender to palpation  Urethra: appears normal  Cervix: normal   Uterus: normal size, shape and consistency  Adnexa: no adnexal tenderness present, no adnexal masses present  Perineum: perineum within normal limits, no evidence of trauma, no rashes or skin lesions present  Anus: anus within normal limits, no hemorrhoids present  Inguinal Lymph Nodes: no lymphadenopathy present    Skin  General Inspection: no rash, no lesions identified    Neurologic/Psychiatric  Mental Status:  Orientation: grossly oriented to person, place and time  Mood and Affect: mood normal, affect appropriate    Assessment:  69 y.o. No obstetric history on file. for well woman exam  Her current medical status is satisfactory with no evidence of significant gynecologic issues. Encounter Diagnoses   Name Primary? Encounter for gynecological examination Yes    Cervical cancer screening        Plan:  The patient was counseled about diet, exercise, healthy lifestyle  I recommend annual well woman exams  We discussed current pap smear and HR HPV testing guidelines. I recommended follow up one year for routine annual gynecologic exam or sooner prn  Handouts were given to the patient  I recommended follow up with a primary care physician for chronic medical problems and evaluation of non-gynecologic concerns and to please contact our office with any GYN questions or concerns. I recommended testing per CDC guidelines and at patient request.           Kayleigh Rocha up:  [x] return for annual well woman exam in one year or sooner if she is having problems  [] follow up and ultrasound  [] 6 months  [] 3 months  [] 6 weeks   [] 1 month    Orders Placed This Encounter    PAP IG, RFX APTIMA HPV ASCUS ([id number])       No results found for any visits on 01/09/23.

## 2023-01-09 NOTE — PROGRESS NOTES
Isabelle Carpio is a 69 y.o. female returns for an annual exam     Chief Complaint   Patient presents with    Well Woman     ABN    No LMP recorded. Patient is postmenopausal.  Her periods are none  She does not have dysmenorrhea. Problems: no significant problems  Birth Control: post menopausal status. Last Pap: normal obtained 5/2019  She does not have a history of SALAZAR 2, 3 or cervical cancer. Last Mammogram:  3/15/2022 . It was normal per pt  Last Bone Density: osteopenic 9/2022  Last colonoscopy: within the last 10 years WNL per pt      1. Have you been to the ER, urgent care clinic, or hospitalized since your last visit? ER 10/2022 [facility] for eye     2. Have you seen or consulted any other health care providers outside of the [address] [place] [address] since your last visit?  Yes Where: [facility]    Examination chaperoned by Emile Jackson MA.

## 2023-01-10 LAB
CYTOLOGIST CVX/VAG CYTO: NORMAL
CYTOLOGY CVX/VAG DOC CYTO: NORMAL
CYTOLOGY CVX/VAG DOC THIN PREP: NORMAL
CYTOLOGY HISTORY:: NORMAL
DX ICD CODE: NORMAL
LABCORP, 190119: NORMAL
Lab: NORMAL
OTHER STN SPEC: NORMAL
STAT OF ADQ CVX/VAG CYTO-IMP: NORMAL

## 2023-01-10 NOTE — PROGRESS NOTES
Normal pap smear, message sent if [address] active. Update PMH/HM: include: Date of pap, Cytology: wnl. For HR HPV results: list NEG or POS, when done.

## 2024-01-10 ENCOUNTER — OFFICE VISIT (OUTPATIENT)
Age: 69
End: 2024-01-10
Payer: MEDICARE

## 2024-01-10 VITALS
HEART RATE: 93 BPM | BODY MASS INDEX: 20.15 KG/M2 | DIASTOLIC BLOOD PRESSURE: 70 MMHG | WEIGHT: 130.6 LBS | SYSTOLIC BLOOD PRESSURE: 105 MMHG

## 2024-01-10 DIAGNOSIS — Z01.419 ENCOUNTER FOR GYNECOLOGICAL EXAMINATION: ICD-10-CM

## 2024-01-10 DIAGNOSIS — M85.80 OSTEOPENIA, UNSPECIFIED LOCATION: Primary | ICD-10-CM

## 2024-01-10 PROCEDURE — 3074F SYST BP LT 130 MM HG: CPT | Performed by: OBSTETRICS & GYNECOLOGY

## 2024-01-10 PROCEDURE — G8484 FLU IMMUNIZE NO ADMIN: HCPCS | Performed by: OBSTETRICS & GYNECOLOGY

## 2024-01-10 PROCEDURE — 3078F DIAST BP <80 MM HG: CPT | Performed by: OBSTETRICS & GYNECOLOGY

## 2024-01-10 PROCEDURE — G0101 CA SCREEN;PELVIC/BREAST EXAM: HCPCS | Performed by: OBSTETRICS & GYNECOLOGY

## 2024-01-10 NOTE — PROGRESS NOTES
Ron Haney is a 68 y.o. female returns for an annual exam     Chief Complaint   Patient presents with    Annual Exam     ABN signed, pt has Medicare & [employer].     No LMP recorded. Patient is postmenopausal.  Her periods are none  She does not have dysmenorrhea.  Problems: no problems  Birth Control: abstinence and post menopausal status.  Last Pap: normal obtained 1 year(s) ago.  She does not have a history of SHANTE 2, 3 or cervical cancer.   Last Mammogram: had a recent mammogram 3/2023 which was negative for malignancy.  Pt gets mammorgam's with [facility], scheduled in 3/2024.   Last Bone Density: 9/2022 osteopenia, due 9/2024  Last colonoscopy: 2015 wnl per pt, 10 year fu, due 9/2025      Examination chaperoned by Khadijah Rush MA.  
Allergies  Patient Active Problem List   Diagnosis    Panic disorder    FH: breast cancer    Carotid artery calcification    Essential hypertension    Dyslipidemia, goal LDL below 70    Dysthymia    FH: brain cancer    FH: breast cancer in first degree relative       Review of Systems - History obtained from the patient and patient filled out questionnaire   Constitutional/general, HEENT, CV, Resp, GI, MSK, Neuro, Psych, Heme/lymph, Skin, Breast ROS: no significant complaints except as noted on HPI    Physical Exam  /70   Pulse 93   Wt 59.2 kg (130 lb 9.6 oz)   BMI 20.15 kg/m²     Constitutional  Appearance: well-nourished, well developed, alert, in no acute distress    HENT  Head and Face: appears normal    Neck  Inspection/Palpation: normal appearance, no masses or tenderness  Lymph Nodes: no lymphadenopathy present  Thyroid: gland size normal, nontender, no nodules or masses present on palpation    Chest  Respiratory Effort: breathing unlabored  Auscultation: normal breath sounds    Cardiovascular  Heart:  Auscultation: regular rate and rhythm without murmur    Breasts  Inspection of Breasts: breasts symmetrical, no skin changes, no discharge present, nipple appearance normal, no skin retraction present  Palpation of Breasts and Axillae: no masses present on palpation, no breast tenderness  Axillary Lymph Nodes: no lymphadenopathy present    Gastrointestinal  Abdominal Examination: abdomen non-tender to palpation, normal bowel sounds, no masses present  Liver and spleen: no hepatomegaly present, spleen not palpable  Hernias: no hernias identified    Genitourinary  External Genitalia: normal appearance for age, no discharge present, no tenderness present, no inflammatory lesions present, no masses present  Vagina: normal vaginal vault without central or paravaginal defects, no discharge present, no inflammatory lesions present, no masses present  Bladder: non-tender to palpation  Urethra: appears